# Patient Record
Sex: MALE | Race: WHITE | Employment: FULL TIME | URBAN - METROPOLITAN AREA
[De-identification: names, ages, dates, MRNs, and addresses within clinical notes are randomized per-mention and may not be internally consistent; named-entity substitution may affect disease eponyms.]

---

## 2017-11-10 ENCOUNTER — TELEPHONE (OUTPATIENT)
Dept: FAMILY MEDICINE CLINIC | Facility: CLINIC | Age: 52
End: 2017-11-10

## 2017-11-10 NOTE — TELEPHONE ENCOUNTER
Patients wife called and scheduled his preop exam for Knee replacement on 12/28/17 [facility] Dr [facility] [phone].  Patient is scheduled for 12/4/17 with Dr Jazmin Hughes. Gene Pool sheet written up and placed in [address]

## 2017-12-11 ENCOUNTER — OFFICE VISIT (OUTPATIENT)
Dept: FAMILY MEDICINE CLINIC | Facility: CLINIC | Age: 52
End: 2017-12-11

## 2017-12-11 VITALS
DIASTOLIC BLOOD PRESSURE: 78 MMHG | RESPIRATION RATE: 16 BRPM | TEMPERATURE: 98 F | HEIGHT: 71 IN | BODY MASS INDEX: 25.48 KG/M2 | HEART RATE: 82 BPM | WEIGHT: 182 LBS | SYSTOLIC BLOOD PRESSURE: 130 MMHG

## 2017-12-11 DIAGNOSIS — Z01.818 PRE-OP EVALUATION: Primary | ICD-10-CM

## 2017-12-11 PROCEDURE — 93000 ELECTROCARDIOGRAM COMPLETE: CPT | Performed by: FAMILY MEDICINE

## 2017-12-11 PROCEDURE — 99243 OFF/OP CNSLTJ NEW/EST LOW 30: CPT | Performed by: FAMILY MEDICINE

## 2017-12-12 NOTE — PROGRESS NOTES
Brandi Merino is a 47year old male who presents for a pre-operative physical exam.   HPI related to surgery:   Brandi Merino is presenting for surgery per request of Dr Paresh Hurd scheduled for a left knee replacement procedure to be pe urination; ALLERGY/IMM. : no food or seasonal allergies     PHYSICAL EXAM:   /78   Pulse 82   Temp 98.4 °F (36.9 °C) (Oral)   Resp 16   Ht 71\"   Wt 182 lb   BMI 25.38 kg/m²    Vital signs: Blood pressure 130/78, pulse 82, temperature 98.4 °F (36.9

## 2017-12-14 ENCOUNTER — LAB ENCOUNTER (OUTPATIENT)
Dept: LAB | Age: 52
End: 2017-12-14
Attending: FAMILY MEDICINE
Payer: COMMERCIAL

## 2017-12-14 DIAGNOSIS — Z01.818 PRE-OP EVALUATION: ICD-10-CM

## 2017-12-14 PROCEDURE — 36415 COLL VENOUS BLD VENIPUNCTURE: CPT | Performed by: FAMILY MEDICINE

## 2017-12-14 PROCEDURE — 85025 COMPLETE CBC W/AUTO DIFF WBC: CPT | Performed by: FAMILY MEDICINE

## 2017-12-14 PROCEDURE — 87081 CULTURE SCREEN ONLY: CPT | Performed by: FAMILY MEDICINE

## 2017-12-14 PROCEDURE — 80048 BASIC METABOLIC PNL TOTAL CA: CPT | Performed by: FAMILY MEDICINE

## 2017-12-19 ENCOUNTER — TELEPHONE (OUTPATIENT)
Dept: FAMILY MEDICINE CLINIC | Facility: CLINIC | Age: 52
End: 2017-12-19

## 2017-12-19 NOTE — TELEPHONE ENCOUNTER
Fax has been sent to [facility] [phone]    Fax confirmed at 12:47pm

## 2017-12-19 NOTE — TELEPHONE ENCOUNTER
preop visit and and all associated to Ton Teran at [facility] [phone] is the fax number

## 2017-12-22 ENCOUNTER — APPOINTMENT (OUTPATIENT)
Dept: LAB | Age: 52
End: 2017-12-22
Attending: FAMILY MEDICINE
Payer: COMMERCIAL

## 2017-12-22 ENCOUNTER — TELEPHONE (OUTPATIENT)
Dept: FAMILY MEDICINE CLINIC | Facility: CLINIC | Age: 52
End: 2017-12-22

## 2017-12-22 DIAGNOSIS — R79.89 ELEVATED SERUM CREATININE: Primary | ICD-10-CM

## 2017-12-22 DIAGNOSIS — R79.89 ELEVATED SERUM CREATININE: ICD-10-CM

## 2017-12-22 PROCEDURE — 80048 BASIC METABOLIC PNL TOTAL CA: CPT | Performed by: FAMILY MEDICINE

## 2017-12-22 PROCEDURE — 36415 COLL VENOUS BLD VENIPUNCTURE: CPT | Performed by: FAMILY MEDICINE

## 2017-12-22 NOTE — TELEPHONE ENCOUNTER
FLORENTIN left for patient. I advised him that his kidney function is elevated and to return to lab today or tomorrow to follow up on the kidney function. Lab hours provided. Advised him to call back if he has additional questions or concerns.

## 2017-12-22 NOTE — TELEPHONE ENCOUNTER
----- Message from Maria G Freeman MD sent at 12/20/2017  9:21 AM CST -----  CR's high, have repeat BMP this week.

## 2017-12-26 ENCOUNTER — TELEPHONE (OUTPATIENT)
Dept: FAMILY MEDICINE CLINIC | Facility: CLINIC | Age: 52
End: 2017-12-26

## 2017-12-26 NOTE — TELEPHONE ENCOUNTER
Cate Asencio MD  [facility] Clinical Staff             Normal. [id number] Valeria Gold for surgery. Recheck in 6-12 months.

## 2017-12-26 NOTE — TELEPHONE ENCOUNTER
VM left advising that labs are normal. OK for surgery. Recheck labs in 6-12 months. Phone number provided for call back if needed.

## 2019-01-16 ENCOUNTER — PATIENT OUTREACH (OUTPATIENT)
Dept: FAMILY MEDICINE CLINIC | Facility: CLINIC | Age: 54
End: 2019-01-16

## 2019-11-16 ENCOUNTER — OFFICE VISIT (OUTPATIENT)
Dept: FAMILY MEDICINE CLINIC | Facility: CLINIC | Age: 54
End: 2019-11-16
Payer: COMMERCIAL

## 2019-11-16 ENCOUNTER — LAB ENCOUNTER (OUTPATIENT)
Dept: LAB | Age: 54
End: 2019-11-16
Attending: FAMILY MEDICINE
Payer: COMMERCIAL

## 2019-11-16 VITALS
TEMPERATURE: 98 F | BODY MASS INDEX: 24.36 KG/M2 | HEIGHT: 71 IN | DIASTOLIC BLOOD PRESSURE: 84 MMHG | WEIGHT: 174 LBS | RESPIRATION RATE: 16 BRPM | SYSTOLIC BLOOD PRESSURE: 120 MMHG | HEART RATE: 68 BPM

## 2019-11-16 DIAGNOSIS — R55 VASOVAGAL SYNCOPE: Primary | ICD-10-CM

## 2019-11-16 DIAGNOSIS — Z00.00 LABORATORY EXAMINATION ORDERED AS PART OF A ROUTINE GENERAL MEDICAL EXAMINATION: ICD-10-CM

## 2019-11-16 PROCEDURE — 99214 OFFICE O/P EST MOD 30 MIN: CPT | Performed by: FAMILY MEDICINE

## 2019-11-16 PROCEDURE — 80061 LIPID PANEL: CPT | Performed by: FAMILY MEDICINE

## 2019-11-16 PROCEDURE — 80050 GENERAL HEALTH PANEL: CPT | Performed by: FAMILY MEDICINE

## 2019-11-16 PROCEDURE — 36415 COLL VENOUS BLD VENIPUNCTURE: CPT | Performed by: FAMILY MEDICINE

## 2019-11-16 NOTE — PATIENT INSTRUCTIONS
Jimmy Stern, [address] [facility],   [address] [place] [address]     Phone: [phone]   Fax: [phone]     Location is same as Dr. Isai Tate office.

## 2019-11-18 NOTE — PROGRESS NOTES
Chief Complaint:   Patient presents with:  Blood Pressure  Syncope: at work yesterday     HPI:   This is a 53year old male presenting for follow-up.   Patient was supposed to go to the walk-in clinic this a.m. for a passing out spell that he had yesterday Negative for chills, diaphoresis, fatigue and fever. HENT: Negative for congestion, ear pain, facial swelling, postnasal drip, rhinorrhea, sinus pressure, sore throat and voice change.     Eyes: Negative for photophobia, pain, discharge, redness, itching oropharyngeal exudate or pharynx erythema. Eyes: Pupils are equal, round, and reactive to light. Conjunctivae and EOM are normal. Right eye exhibits no discharge. Left eye exhibits no discharge. Neck: Normal range of motion. Neck supple.  No JVD present

## 2020-01-20 ENCOUNTER — OFFICE VISIT (OUTPATIENT)
Dept: FAMILY MEDICINE CLINIC | Facility: CLINIC | Age: 55
End: 2020-01-20
Payer: COMMERCIAL

## 2020-01-20 VITALS
SYSTOLIC BLOOD PRESSURE: 132 MMHG | HEIGHT: 71 IN | DIASTOLIC BLOOD PRESSURE: 84 MMHG | BODY MASS INDEX: 25.2 KG/M2 | TEMPERATURE: 98 F | WEIGHT: 180 LBS | HEART RATE: 72 BPM | RESPIRATION RATE: 16 BRPM

## 2020-01-20 DIAGNOSIS — Z12.5 SCREENING PSA (PROSTATE SPECIFIC ANTIGEN): ICD-10-CM

## 2020-01-20 DIAGNOSIS — Z12.11 SCREEN FOR COLON CANCER: ICD-10-CM

## 2020-01-20 DIAGNOSIS — Z00.00 ANNUAL PHYSICAL EXAM: Primary | ICD-10-CM

## 2020-01-20 PROCEDURE — 99396 PREV VISIT EST AGE 40-64: CPT | Performed by: FAMILY MEDICINE

## 2020-01-21 ENCOUNTER — APPOINTMENT (OUTPATIENT)
Dept: LAB | Age: 55
End: 2020-01-21
Attending: FAMILY MEDICINE
Payer: COMMERCIAL

## 2020-01-21 DIAGNOSIS — Z12.5 SCREENING PSA (PROSTATE SPECIFIC ANTIGEN): ICD-10-CM

## 2020-01-21 LAB — COMPLEXED PSA SERPL-MCNC: 1.9 NG/ML (ref ?–4)

## 2020-01-21 PROCEDURE — 36415 COLL VENOUS BLD VENIPUNCTURE: CPT | Performed by: FAMILY MEDICINE

## 2020-01-21 PROCEDURE — 84153 ASSAY OF PSA TOTAL: CPT | Performed by: FAMILY MEDICINE

## 2020-02-06 ENCOUNTER — OFFICE VISIT (OUTPATIENT)
Dept: SURGERY | Facility: CLINIC | Age: 55
End: 2020-02-06
Payer: COMMERCIAL

## 2020-02-06 VITALS
SYSTOLIC BLOOD PRESSURE: 155 MMHG | HEIGHT: 71 IN | WEIGHT: 180 LBS | HEART RATE: 74 BPM | DIASTOLIC BLOOD PRESSURE: 86 MMHG | BODY MASS INDEX: 25.2 KG/M2

## 2020-02-06 DIAGNOSIS — Z86.010 HISTORY OF COLON POLYPS: Primary | ICD-10-CM

## 2020-02-06 PROCEDURE — S0285 CNSLT BEFORE SCREEN COLONOSC: HCPCS | Performed by: COLON & RECTAL SURGERY

## 2020-02-06 NOTE — H&P
New Patient Visit Note       Active Problems      1.  History of colon polyps        Chief Complaint   Patient presents with:  Colonoscopy: Np  for colonoscopy consult referred by Dr. Carlos Alberto Minaya.  Pt had a colonoscopy 5 years ago and was supposed to repeat in No pertinent family history. Social History    Tobacco Use      Smoking status: Never Smoker      Smokeless tobacco: Never Used    Alcohol use:  Yes      Alcohol/week: 0.0 standard drinks      Comment: soc    Drug use: No     Esomeprazole Magnesium ([address] tenderness. No hernia. Musculoskeletal: Normal range of motion. General: No edema. Lymphadenopathy:     He has no cervical adenopathy. Neurological: He is alert and oriented to person, place, and time. Skin: Skin is warm and dry.  No rash no

## 2020-02-08 NOTE — PATIENT INSTRUCTIONS
Assessment   History of colon polyps  (primary encounter diagnosis)      Plan   The patient is 53year old and has had a colonoscopy, this was 5 years ago and reportedly polyps were removed. The patient's family history is negative.  The patient does have g

## 2020-02-20 ENCOUNTER — TELEPHONE (OUTPATIENT)
Dept: SURGERY | Facility: CLINIC | Age: 55
End: 2020-02-20

## 2020-02-20 RX ORDER — POLYETHYLENE GLYCOL 3350, SODIUM CHLORIDE, SODIUM BICARBONATE, POTASSIUM CHLORIDE 420; 11.2; 5.72; 1.48 G/4L; G/4L; G/4L; G/4L
POWDER, FOR SOLUTION ORAL
Qty: 1 BOTTLE | Refills: 0 | Status: SHIPPED | OUTPATIENT
Start: 2020-02-20 | End: 2020-03-16 | Stop reason: ALTCHOICE

## 2020-02-20 NOTE — TELEPHONE ENCOUNTER
Called patient to review surgery checklist. Patient is scheduled to undergo a cscope on 2/25/2020 by VIDHYA at [facility]. Patient verbalizes understanding with no further questions or concerns at this time. Verified pharmacy, sent medication.  V/U and no further que

## 2020-02-25 ENCOUNTER — MED REC SCAN ONLY (OUTPATIENT)
Dept: SURGERY | Facility: CLINIC | Age: 55
End: 2020-02-25

## 2020-02-25 ENCOUNTER — LAB REQUISITION (OUTPATIENT)
Dept: LAB | Facility: HOSPITAL | Age: 55
End: 2020-02-25
Payer: COMMERCIAL

## 2020-02-25 DIAGNOSIS — Z86.010 PERSONAL HISTORY OF COLONIC POLYPS: ICD-10-CM

## 2020-02-25 PROCEDURE — 88305 TISSUE EXAM BY PATHOLOGIST: CPT | Performed by: COLON & RECTAL SURGERY

## 2020-02-27 NOTE — PROGRESS NOTES
Called patient and appt scheduled.     Future Appointments  3/16/2020  4:00 PM    Maggie Silva MD  [facility]

## 2020-02-29 ENCOUNTER — MED REC SCAN ONLY (OUTPATIENT)
Dept: FAMILY MEDICINE CLINIC | Facility: CLINIC | Age: 55
End: 2020-02-29

## 2020-03-05 ENCOUNTER — MED REC SCAN ONLY (OUTPATIENT)
Dept: SURGERY | Facility: CLINIC | Age: 55
End: 2020-03-05

## 2020-03-16 ENCOUNTER — OFFICE VISIT (OUTPATIENT)
Dept: SURGERY | Facility: CLINIC | Age: 55
End: 2020-03-16
Payer: COMMERCIAL

## 2020-03-16 VITALS
HEART RATE: 71 BPM | HEIGHT: 71 IN | RESPIRATION RATE: 16 BRPM | WEIGHT: 179 LBS | BODY MASS INDEX: 25.06 KG/M2 | DIASTOLIC BLOOD PRESSURE: 90 MMHG | TEMPERATURE: 97 F | SYSTOLIC BLOOD PRESSURE: 150 MMHG

## 2020-03-16 DIAGNOSIS — K64.2 GRADE III INTERNAL HEMORRHOIDS: ICD-10-CM

## 2020-03-16 DIAGNOSIS — K63.5 SERRATED POLYP OF COLON: Primary | ICD-10-CM

## 2020-03-16 PROCEDURE — 99212 OFFICE O/P EST SF 10 MIN: CPT | Performed by: COLON & RECTAL SURGERY

## 2020-03-16 NOTE — PROGRESS NOTES
Office Visit Note       Active Problems  1. Serrated polyp of colon    2. Grade III internal hemorrhoids         Chief Complaint   Patient presents with:   Follow - Up: No current complaints         History of Present Illness   Ashish Christopher is a 53 year pain, anal bleeding, blood in stool, constipation, diarrhea, nausea, rectal pain and vomiting. Endocrine: Negative for polydipsia and polyuria. Genitourinary: Negative for dysuria and hematuria. Musculoskeletal: Negative for arthralgias and myalgias.                      Specimens:   A) - Cecum, Cecal polyps x2                                                                          B) - Colon ascending, Ascending colon polyp                                                Final Diagnosis:   A.   Cecum,

## 2020-03-19 NOTE — PATIENT INSTRUCTIONS
Assessment   Serrated polyp of colon  (primary encounter diagnosis)  Grade III internal hemorrhoids      Plan   The patient is doing well after colonoscopy. Leticia Waggoner is a 53year old male who underwent colonoscopy on 2/25/2020.  The patient prese

## 2020-03-23 ENCOUNTER — MED REC SCAN ONLY (OUTPATIENT)
Dept: SURGERY | Facility: CLINIC | Age: 55
End: 2020-03-23

## 2021-03-01 ENCOUNTER — OFFICE VISIT (OUTPATIENT)
Dept: SURGERY | Facility: CLINIC | Age: 56
End: 2021-03-01
Payer: COMMERCIAL

## 2021-03-01 ENCOUNTER — LAB ENCOUNTER (OUTPATIENT)
Dept: LAB | Age: 56
End: 2021-03-01
Attending: COLON & RECTAL SURGERY
Payer: COMMERCIAL

## 2021-03-01 VITALS
SYSTOLIC BLOOD PRESSURE: 129 MMHG | HEIGHT: 71 IN | HEART RATE: 94 BPM | TEMPERATURE: 97 F | DIASTOLIC BLOOD PRESSURE: 83 MMHG | BODY MASS INDEX: 25.06 KG/M2 | WEIGHT: 179 LBS

## 2021-03-01 DIAGNOSIS — K64.8 HEMORRHOID PROLAPSE: Primary | ICD-10-CM

## 2021-03-01 DIAGNOSIS — K64.8 HEMORRHOID PROLAPSE: ICD-10-CM

## 2021-03-01 PROCEDURE — 3074F SYST BP LT 130 MM HG: CPT | Performed by: COLON & RECTAL SURGERY

## 2021-03-01 PROCEDURE — 3079F DIAST BP 80-89 MM HG: CPT | Performed by: COLON & RECTAL SURGERY

## 2021-03-01 PROCEDURE — 99214 OFFICE O/P EST MOD 30 MIN: CPT | Performed by: COLON & RECTAL SURGERY

## 2021-03-01 PROCEDURE — 3008F BODY MASS INDEX DOCD: CPT | Performed by: COLON & RECTAL SURGERY

## 2021-03-01 NOTE — PROGRESS NOTES
Office Visit Note       Active Problems  1.  Hemorrhoid prolapse         Chief Complaint   Patient presents with:  Hemorrhoid Banding         History of Present Illness   Francisca Baptiste is a 63year old male who was found to have significantly enlarged in sphincter tone. There was prominent tissue in the right posterior location, as well as in the right anterior location. Anoscopy was not performed.          Assessment   Hemorrhoid prolapse  (primary encounter diagnosis)      Plan   The patient had prese

## 2021-03-02 LAB — SARS-COV-2 RNA RESP QL NAA+PROBE: NOT DETECTED

## 2021-03-03 ENCOUNTER — HOSPITAL ENCOUNTER (OUTPATIENT)
Facility: HOSPITAL | Age: 56
Setting detail: HOSPITAL OUTPATIENT SURGERY
Discharge: HOME | End: 2021-03-03
Attending: COLON & RECTAL SURGERY | Admitting: COLON & RECTAL SURGERY
Payer: COMMERCIAL

## 2021-03-03 ENCOUNTER — ANESTHESIA EVENT (OUTPATIENT)
Dept: SURGERY | Facility: HOSPITAL | Age: 56
End: 2021-03-03
Payer: COMMERCIAL

## 2021-03-03 ENCOUNTER — ANESTHESIA (OUTPATIENT)
Dept: SURGERY | Facility: HOSPITAL | Age: 56
End: 2021-03-03
Payer: COMMERCIAL

## 2021-03-03 VITALS
SYSTOLIC BLOOD PRESSURE: 149 MMHG | WEIGHT: 186.63 LBS | TEMPERATURE: 98 F | OXYGEN SATURATION: 98 % | DIASTOLIC BLOOD PRESSURE: 92 MMHG | HEIGHT: 71 IN | RESPIRATION RATE: 18 BRPM | HEART RATE: 82 BPM | BODY MASS INDEX: 26.13 KG/M2

## 2021-03-03 DIAGNOSIS — K64.8 HEMORRHOID PROLAPSE: Primary | ICD-10-CM

## 2021-03-03 PROCEDURE — 46260 REMOVE IN/EX HEM GROUPS 2+: CPT | Performed by: COLON & RECTAL SURGERY

## 2021-03-03 PROCEDURE — 06LY0CC OCCLUSION OF HEMORRHOIDAL PLEXUS WITH EXTRALUMINAL DEVICE, OPEN APPROACH: ICD-10-PCS | Performed by: COLON & RECTAL SURGERY

## 2021-03-03 PROCEDURE — 06BY0ZC EXCISION OF HEMORRHOIDAL PLEXUS, OPEN APPROACH: ICD-10-PCS | Performed by: COLON & RECTAL SURGERY

## 2021-03-03 RX ORDER — EPHEDRINE SULFATE 50 MG/ML
INJECTION INTRAVENOUS AS NEEDED
Status: DISCONTINUED | OUTPATIENT
Start: 2021-03-03 | End: 2021-03-03 | Stop reason: SURG

## 2021-03-03 RX ORDER — ACETAMINOPHEN 500 MG
1000 TABLET ORAL ONCE
COMMUNITY

## 2021-03-03 RX ORDER — LIDOCAINE HYDROCHLORIDE 10 MG/ML
INJECTION, SOLUTION EPIDURAL; INFILTRATION; INTRACAUDAL; PERINEURAL AS NEEDED
Status: DISCONTINUED | OUTPATIENT
Start: 2021-03-03 | End: 2021-03-03 | Stop reason: SURG

## 2021-03-03 RX ORDER — SODIUM CHLORIDE, SODIUM LACTATE, POTASSIUM CHLORIDE, CALCIUM CHLORIDE 600; 310; 30; 20 MG/100ML; MG/100ML; MG/100ML; MG/100ML
INJECTION, SOLUTION INTRAVENOUS CONTINUOUS
Status: DISCONTINUED | OUTPATIENT
Start: 2021-03-03 | End: 2021-03-03

## 2021-03-03 RX ORDER — HYDROMORPHONE HYDROCHLORIDE 1 MG/ML
0.4 INJECTION, SOLUTION INTRAMUSCULAR; INTRAVENOUS; SUBCUTANEOUS EVERY 5 MIN PRN
Status: DISCONTINUED | OUTPATIENT
Start: 2021-03-03 | End: 2021-03-03

## 2021-03-03 RX ORDER — BUPIVACAINE HYDROCHLORIDE AND EPINEPHRINE 2.5; 5 MG/ML; UG/ML
INJECTION, SOLUTION EPIDURAL; INFILTRATION; INTRACAUDAL; PERINEURAL AS NEEDED
Status: DISCONTINUED | OUTPATIENT
Start: 2021-03-03 | End: 2021-03-03 | Stop reason: HOSPADM

## 2021-03-03 RX ORDER — HYDROCODONE BITARTRATE AND ACETAMINOPHEN 5; 325 MG/1; MG/1
1 TABLET ORAL AS NEEDED
Status: COMPLETED | OUTPATIENT
Start: 2021-03-03 | End: 2021-03-03

## 2021-03-03 RX ORDER — ACETAMINOPHEN 500 MG
1000 TABLET ORAL ONCE AS NEEDED
Status: DISCONTINUED | OUTPATIENT
Start: 2021-03-03 | End: 2021-03-03

## 2021-03-03 RX ORDER — METOCLOPRAMIDE HYDROCHLORIDE 5 MG/ML
10 INJECTION INTRAMUSCULAR; INTRAVENOUS AS NEEDED
Status: DISCONTINUED | OUTPATIENT
Start: 2021-03-03 | End: 2021-03-03

## 2021-03-03 RX ORDER — OXYCODONE HYDROCHLORIDE 5 MG/1
5 TABLET ORAL EVERY 6 HOURS PRN
Qty: 30 TABLET | Refills: 0 | Status: SHIPPED | OUTPATIENT
Start: 2021-03-03

## 2021-03-03 RX ORDER — HYDROCODONE BITARTRATE AND ACETAMINOPHEN 5; 325 MG/1; MG/1
2 TABLET ORAL AS NEEDED
Status: COMPLETED | OUTPATIENT
Start: 2021-03-03 | End: 2021-03-03

## 2021-03-03 RX ORDER — ONDANSETRON 2 MG/ML
4 INJECTION INTRAMUSCULAR; INTRAVENOUS AS NEEDED
Status: DISCONTINUED | OUTPATIENT
Start: 2021-03-03 | End: 2021-03-03

## 2021-03-03 RX ORDER — ONDANSETRON 2 MG/ML
INJECTION INTRAMUSCULAR; INTRAVENOUS AS NEEDED
Status: DISCONTINUED | OUTPATIENT
Start: 2021-03-03 | End: 2021-03-03 | Stop reason: SURG

## 2021-03-03 RX ORDER — KETOROLAC TROMETHAMINE 30 MG/ML
INJECTION, SOLUTION INTRAMUSCULAR; INTRAVENOUS AS NEEDED
Status: DISCONTINUED | OUTPATIENT
Start: 2021-03-03 | End: 2021-03-03 | Stop reason: SURG

## 2021-03-03 RX ORDER — ACETAMINOPHEN 500 MG
1000 TABLET ORAL ONCE
Status: DISCONTINUED | OUTPATIENT
Start: 2021-03-03 | End: 2021-03-03 | Stop reason: HOSPADM

## 2021-03-03 RX ORDER — DEXAMETHASONE SODIUM PHOSPHATE 4 MG/ML
VIAL (ML) INJECTION AS NEEDED
Status: DISCONTINUED | OUTPATIENT
Start: 2021-03-03 | End: 2021-03-03 | Stop reason: SURG

## 2021-03-03 RX ADMIN — EPHEDRINE SULFATE 10 MG: 50 INJECTION INTRAVENOUS at 15:01:00

## 2021-03-03 RX ADMIN — LIDOCAINE HYDROCHLORIDE 50 MG: 10 INJECTION, SOLUTION EPIDURAL; INFILTRATION; INTRACAUDAL; PERINEURAL at 15:10:00

## 2021-03-03 RX ADMIN — KETOROLAC TROMETHAMINE 30 MG: 30 INJECTION, SOLUTION INTRAMUSCULAR; INTRAVENOUS at 15:36:00

## 2021-03-03 RX ADMIN — DEXAMETHASONE SODIUM PHOSPHATE 4 MG: 4 MG/ML VIAL (ML) INJECTION at 14:46:00

## 2021-03-03 RX ADMIN — EPHEDRINE SULFATE 10 MG: 50 INJECTION INTRAVENOUS at 15:03:00

## 2021-03-03 RX ADMIN — SODIUM CHLORIDE, SODIUM LACTATE, POTASSIUM CHLORIDE, CALCIUM CHLORIDE: 600; 310; 30; 20 INJECTION, SOLUTION INTRAVENOUS at 14:28:00

## 2021-03-03 RX ADMIN — SODIUM CHLORIDE, SODIUM LACTATE, POTASSIUM CHLORIDE, CALCIUM CHLORIDE: 600; 310; 30; 20 INJECTION, SOLUTION INTRAVENOUS at 15:01:00

## 2021-03-03 RX ADMIN — ONDANSETRON 4 MG: 2 INJECTION INTRAMUSCULAR; INTRAVENOUS at 14:56:00

## 2021-03-03 RX ADMIN — EPHEDRINE SULFATE 10 MG: 50 INJECTION INTRAVENOUS at 14:48:00

## 2021-03-03 RX ADMIN — LIDOCAINE HYDROCHLORIDE 50 MG: 10 INJECTION, SOLUTION EPIDURAL; INFILTRATION; INTRACAUDAL; PERINEURAL at 14:30:00

## 2021-03-03 NOTE — H&P
See note below. The patient presents for hemorrhoidectomy. All questions answered. Proceed with surgery. Pierce Sanders MD  [facility]  3/3/2021  2:09 PM        Active Problems  1.  Hemorrhoid prolapse         Chief Complaint   No thee Resp 16   Ht 71\"   Wt 186 lb 9.9 oz (84.6 kg)   SpO2 100%   BMI 26.03 kg/m²   Physical Exam     The perineum and perianal skin were examined. There was a markedly enlarged right posterior external hemorrhoid with prolapsing internal hemorrhoid.   There wa

## 2021-03-03 NOTE — ANESTHESIA POSTPROCEDURE EVALUATION
Metsa 48 Patient Status:  Hospital Outpatient Surgery   Age/Gender 63year old male MRN [id number]   [facility] SURGERY Attending Frieda Norman MD   Hosp Day # 0 PCP Mendoza Grullon MD       Anesthesia Post-op

## 2021-03-03 NOTE — ANESTHESIA PROCEDURE NOTES
Airway  Urgency: elective      General Information and Staff    Patient location during procedure: OR  Anesthesiologist: Tabitha Recio MD  Performed: anesthesiologist     Indications and Patient Condition  Indications for airway management: anesthesia  Se

## 2021-03-03 NOTE — ANESTHESIA PREPROCEDURE EVALUATION
PRE-OP EVALUATION    Patient Name: Venu Reagan    Pre-op Diagnosis: Hemorrhoid prolapse [K64.8]    Procedure(s):  EXCISIONAL HEMORRHOIDECTOMY    Surgeon(s) and Role:     * Dodie Villalta MD - Primary    Pre-op vitals reviewed.   Temp: 97.5 °F ( or 2      Binge frequency: Never      Comment: soc      Drug use: No     Available pre-op labs reviewed.                Airway      Mallampati: I  Mouth opening: >3 FB  TM distance: > 6 cm  Neck ROM: full Cardiovascular      Rhythm: regular  Rate: normal

## 2021-03-03 NOTE — OPERATIVE REPORT
[facility]  Operative Report    Gerri Ramirez Location: OR   CSN [id number] MRN [id number]   Admission Date 3/3/2021 Operation Date 3/3/2021   Attending Physician Pamla Ahumada, MD Operating Physician Bernabe Allen MD       Patient Na buttocks were taped apart, and the perineum was prepped with Betadine paint. Sterile drapes were placed with wide exposure of the perineum. Pre-operative antibiotics were given. A time-out was performed.     The anus was gently dilated with serially larger end of the case.     India Gutierrez MD  3/3/2021  3:53 PM

## 2021-03-04 ENCOUNTER — MED REC SCAN ONLY (OUTPATIENT)
Dept: SURGERY | Facility: CLINIC | Age: 56
End: 2021-03-04

## 2021-03-16 ENCOUNTER — OFFICE VISIT (OUTPATIENT)
Dept: SURGERY | Facility: CLINIC | Age: 56
End: 2021-03-16

## 2021-03-16 VITALS
TEMPERATURE: 97 F | HEART RATE: 99 BPM | HEIGHT: 71 IN | DIASTOLIC BLOOD PRESSURE: 109 MMHG | BODY MASS INDEX: 25.9 KG/M2 | WEIGHT: 185 LBS | SYSTOLIC BLOOD PRESSURE: 186 MMHG

## 2021-03-16 DIAGNOSIS — Z09 FOLLOW-UP EXAMINATION: Primary | ICD-10-CM

## 2021-03-16 PROCEDURE — 99024 POSTOP FOLLOW-UP VISIT: CPT | Performed by: COLON & RECTAL SURGERY

## 2021-03-16 PROCEDURE — 3008F BODY MASS INDEX DOCD: CPT | Performed by: COLON & RECTAL SURGERY

## 2021-03-16 PROCEDURE — 3077F SYST BP >= 140 MM HG: CPT | Performed by: COLON & RECTAL SURGERY

## 2021-03-16 PROCEDURE — 3080F DIAST BP >= 90 MM HG: CPT | Performed by: COLON & RECTAL SURGERY

## 2021-03-16 NOTE — PROGRESS NOTES
Office Visit Note       Active Problems      1.  Follow-up examination          Chief Complaint   Patient presents with:  Post-Op        History of Present Illness  Gabrielle Rubalcava is a 63year old male who presents today for ongoing evaluation and treatme Types: 2 Glasses of wine, 2 Cans of beer per week      Comment: soc    Drug use: No     Esomeprazole Magnesium (NEXIUM) 40 MG Oral Capsule Delayed Release,   acetaminophen 500 MG Oral Tab, Take 1,000 mg by mouth once.  (Patient not taking: Reported on 3/16/ There was some mild heaped up tissue on the right lateral apex without inflammation or induration. Digital exam and anoscopy were not performed.          Assessment   Follow-up examination  (primary encounter diagnosis)    Plan   Patient is doing well 2

## 2022-02-19 ENCOUNTER — OFFICE VISIT (OUTPATIENT)
Dept: FAMILY MEDICINE CLINIC | Facility: CLINIC | Age: 57
End: 2022-02-19
Payer: COMMERCIAL

## 2022-02-19 VITALS
BODY MASS INDEX: 26.18 KG/M2 | OXYGEN SATURATION: 99 % | TEMPERATURE: 98 F | HEART RATE: 80 BPM | WEIGHT: 187 LBS | RESPIRATION RATE: 19 BRPM | SYSTOLIC BLOOD PRESSURE: 126 MMHG | DIASTOLIC BLOOD PRESSURE: 86 MMHG | HEIGHT: 71 IN

## 2022-02-19 DIAGNOSIS — Z12.5 SCREENING FOR PROSTATE CANCER: ICD-10-CM

## 2022-02-19 DIAGNOSIS — Z00.00 LABORATORY EXAMINATION ORDERED AS PART OF A ROUTINE GENERAL MEDICAL EXAMINATION: ICD-10-CM

## 2022-02-19 DIAGNOSIS — M25.511 ACUTE PAIN OF RIGHT SHOULDER: Primary | ICD-10-CM

## 2022-02-19 DIAGNOSIS — S46.911A STRAIN OF RIGHT SHOULDER, INITIAL ENCOUNTER: ICD-10-CM

## 2022-02-19 PROCEDURE — 99214 OFFICE O/P EST MOD 30 MIN: CPT | Performed by: FAMILY MEDICINE

## 2022-02-19 PROCEDURE — 3074F SYST BP LT 130 MM HG: CPT | Performed by: FAMILY MEDICINE

## 2022-02-19 PROCEDURE — 3008F BODY MASS INDEX DOCD: CPT | Performed by: FAMILY MEDICINE

## 2022-02-19 PROCEDURE — 3079F DIAST BP 80-89 MM HG: CPT | Performed by: FAMILY MEDICINE

## 2022-02-19 RX ORDER — ACETAMINOPHEN AND CODEINE PHOSPHATE 300; 30 MG/1; MG/1
1 TABLET ORAL NIGHTLY PRN
Qty: 20 TABLET | Refills: 0 | Status: SHIPPED | OUTPATIENT
Start: 2022-02-19

## 2022-02-19 RX ORDER — IBUPROFEN 800 MG/1
800 TABLET ORAL EVERY 8 HOURS PRN
Qty: 60 TABLET | Refills: 0 | Status: SHIPPED | OUTPATIENT
Start: 2022-02-19 | End: 2022-03-21

## 2022-02-22 ENCOUNTER — TELEPHONE (OUTPATIENT)
Dept: FAMILY MEDICINE CLINIC | Facility: CLINIC | Age: 57
End: 2022-02-22

## 2022-02-22 NOTE — TELEPHONE ENCOUNTER
Patient referred to Dr. Jaxon Bahena; however, office in [place] and cannot be seen until 3/5. Would like a chiropractor in [facility]. Notified the patient of need to call insurance for providers in network. Patient verbalized understanding. States that he scheduled appointment with Dr. Jaxon Bahena on 3/5. Will keep appointment. Answered all questions at this time.

## 2022-02-22 NOTE — TELEPHONE ENCOUNTER
Pt was referred to [facility] in [place] but can't get in until March 5th. Pt looking for something closer in the [place] area.

## 2022-02-28 ENCOUNTER — HOSPITAL ENCOUNTER (OUTPATIENT)
Dept: GENERAL RADIOLOGY | Age: 57
Discharge: HOME | End: 2022-02-28
Attending: FAMILY MEDICINE
Payer: COMMERCIAL

## 2022-02-28 DIAGNOSIS — M25.511 ACUTE PAIN OF RIGHT SHOULDER: ICD-10-CM

## 2022-02-28 DIAGNOSIS — S46.911A STRAIN OF RIGHT SHOULDER, INITIAL ENCOUNTER: ICD-10-CM

## 2022-02-28 PROCEDURE — 73030 X-RAY EXAM OF SHOULDER: CPT | Performed by: FAMILY MEDICINE

## 2022-02-28 NOTE — TELEPHONE ENCOUNTER
Patient requesting Physical therapy for shoulder.  Need Goals, region, frequency, duration and number of visits in order to fill out referral at [facility]. Patients request.

## 2022-02-28 NOTE — TELEPHONE ENCOUNTER
Pt was told by a friend to try [facility] in [address] [facility] on [address]. Pt requesting script for [employer] -PT instead of joya.

## 2022-03-01 NOTE — TELEPHONE ENCOUNTER
Roxane Latham for PT referral, evaluate and treat to affected shoulder ok for 2 visits/week for 6-8 weeks.

## 2022-03-02 ENCOUNTER — TELEPHONE (OUTPATIENT)
Dept: FAMILY MEDICINE CLINIC | Facility: CLINIC | Age: 57
End: 2022-03-02

## 2022-03-28 ENCOUNTER — MED REC SCAN ONLY (OUTPATIENT)
Dept: FAMILY MEDICINE CLINIC | Facility: CLINIC | Age: 57
End: 2022-03-28

## 2022-04-05 RX ORDER — IBUPROFEN 800 MG/1
TABLET ORAL
Qty: 60 TABLET | Refills: 0 | Status: SHIPPED | OUTPATIENT
Start: 2022-04-05

## 2022-09-15 DIAGNOSIS — M25.511 ACUTE PAIN OF RIGHT SHOULDER: ICD-10-CM

## 2022-09-15 RX ORDER — IBUPROFEN 800 MG/1
TABLET ORAL
Qty: 60 TABLET | Refills: 0 | Status: SHIPPED | OUTPATIENT
Start: 2022-09-15

## 2022-10-07 ENCOUNTER — TELEPHONE (OUTPATIENT)
Dept: FAMILY MEDICINE CLINIC | Facility: CLINIC | Age: 57
End: 2022-10-07

## 2022-10-07 NOTE — TELEPHONE ENCOUNTER
Patient stated that he's having right shoulder pain and asked if Dr Aryan Pathak can order a MRI for him. I tried scheduling him to come in tomorrow and early next week, however he couldn't make those times available.      Please Advise

## 2022-10-14 ENCOUNTER — OFFICE VISIT (OUTPATIENT)
Dept: FAMILY MEDICINE CLINIC | Facility: CLINIC | Age: 57
End: 2022-10-14
Payer: COMMERCIAL

## 2022-10-14 VITALS
HEIGHT: 71 IN | BODY MASS INDEX: 25.76 KG/M2 | OXYGEN SATURATION: 97 % | WEIGHT: 184 LBS | HEART RATE: 86 BPM | SYSTOLIC BLOOD PRESSURE: 120 MMHG | DIASTOLIC BLOOD PRESSURE: 84 MMHG | RESPIRATION RATE: 16 BRPM

## 2022-10-14 DIAGNOSIS — G89.29 CHRONIC RIGHT SHOULDER PAIN: ICD-10-CM

## 2022-10-14 DIAGNOSIS — M25.511 CHRONIC RIGHT SHOULDER PAIN: ICD-10-CM

## 2022-10-14 DIAGNOSIS — Z00.00 ANNUAL PHYSICAL EXAM: Primary | ICD-10-CM

## 2022-10-14 DIAGNOSIS — S46.001D INJURY OF RIGHT ROTATOR CUFF, SUBSEQUENT ENCOUNTER: ICD-10-CM

## 2022-10-31 ENCOUNTER — TELEPHONE (OUTPATIENT)
Dept: FAMILY MEDICINE CLINIC | Facility: CLINIC | Age: 57
End: 2022-10-31

## 2022-10-31 DIAGNOSIS — M25.511 CHRONIC RIGHT SHOULDER PAIN: Primary | ICD-10-CM

## 2022-10-31 DIAGNOSIS — G89.29 CHRONIC RIGHT SHOULDER PAIN: Primary | ICD-10-CM

## 2022-10-31 NOTE — TELEPHONE ENCOUNTER
[facility] called regarding MRI order of shoulder.  Order MUST state with or without contrast. Please fax new order to [phone]

## 2022-10-31 NOTE — TELEPHONE ENCOUNTER
Patient was to have an MRI at [facility] it was delayed due to an Emergency. Patient would like  The order send to  [facility]. Please Advise. Thank you.

## 2022-10-31 NOTE — TELEPHONE ENCOUNTER
Phoned pt. To determine which location to fax MRI order to. Printed and faxed to [facility], per Pt.  Request.

## 2022-11-16 ENCOUNTER — TELEPHONE (OUTPATIENT)
Dept: FAMILY MEDICINE CLINIC | Facility: CLINIC | Age: 57
End: 2022-11-16

## 2022-11-16 NOTE — TELEPHONE ENCOUNTER
Ikerhart to advise, MRI from yesterday at outside facility not yet reviewed but will provide recommendations once reviewed and available.

## 2022-12-02 ENCOUNTER — PATIENT MESSAGE (OUTPATIENT)
Dept: FAMILY MEDICINE CLINIC | Facility: CLINIC | Age: 57
End: 2022-12-02

## 2022-12-02 DIAGNOSIS — M25.511 ACUTE PAIN OF RIGHT SHOULDER: ICD-10-CM

## 2022-12-02 DIAGNOSIS — G89.29 CHRONIC RIGHT SHOULDER PAIN: Primary | ICD-10-CM

## 2022-12-02 DIAGNOSIS — S46.001D INJURY OF RIGHT ROTATOR CUFF, SUBSEQUENT ENCOUNTER: ICD-10-CM

## 2022-12-02 DIAGNOSIS — M25.511 CHRONIC RIGHT SHOULDER PAIN: Primary | ICD-10-CM

## 2022-12-05 ENCOUNTER — TELEPHONE (OUTPATIENT)
Dept: ORTHOPEDICS CLINIC | Facility: CLINIC | Age: 57
End: 2022-12-05

## 2022-12-05 DIAGNOSIS — M25.511 RIGHT SHOULDER PAIN, UNSPECIFIED CHRONICITY: Primary | ICD-10-CM

## 2022-12-05 NOTE — TELEPHONE ENCOUNTER
NP Right Shoulder Pain/Imaging in Epic. Please advise if additional imaging is needed.   Future Appointments   Date Time Provider Treasure Mcmanus   12/14/2022  2:40 PM Katie Remy MD [facility] [id number]

## 2022-12-05 NOTE — TELEPHONE ENCOUNTER
[facility] called requesting PT orders be faxed to their office: [phone].     Please advise, thank you

## 2022-12-06 ENCOUNTER — MED REC SCAN ONLY (OUTPATIENT)
Dept: FAMILY MEDICINE CLINIC | Facility: CLINIC | Age: 57
End: 2022-12-06

## 2022-12-06 RX ORDER — IBUPROFEN 800 MG/1
TABLET ORAL
Qty: 60 TABLET | Refills: 0 | Status: SHIPPED | OUTPATIENT
Start: 2022-12-06

## 2022-12-14 ENCOUNTER — HOSPITAL ENCOUNTER (OUTPATIENT)
Dept: GENERAL RADIOLOGY | Age: 57
Discharge: HOME | End: 2022-12-14
Attending: ORTHOPAEDIC SURGERY
Payer: COMMERCIAL

## 2022-12-14 ENCOUNTER — OFFICE VISIT (OUTPATIENT)
Dept: ORTHOPEDICS CLINIC | Facility: CLINIC | Age: 57
End: 2022-12-14
Payer: COMMERCIAL

## 2022-12-14 DIAGNOSIS — M19.019 GLENOHUMERAL ARTHRITIS: Primary | ICD-10-CM

## 2022-12-14 DIAGNOSIS — M25.511 RIGHT SHOULDER PAIN, UNSPECIFIED CHRONICITY: ICD-10-CM

## 2022-12-14 PROCEDURE — 73030 X-RAY EXAM OF SHOULDER: CPT | Performed by: ORTHOPAEDIC SURGERY

## 2022-12-14 PROCEDURE — 20610 DRAIN/INJ JOINT/BURSA W/O US: CPT | Performed by: ORTHOPAEDIC SURGERY

## 2022-12-14 PROCEDURE — 99204 OFFICE O/P NEW MOD 45 MIN: CPT | Performed by: ORTHOPAEDIC SURGERY

## 2022-12-14 RX ORDER — KETOROLAC TROMETHAMINE 30 MG/ML
30 INJECTION, SOLUTION INTRAMUSCULAR; INTRAVENOUS ONCE
Status: COMPLETED | OUTPATIENT
Start: 2022-12-14 | End: 2022-12-14

## 2022-12-14 RX ORDER — TRIAMCINOLONE ACETONIDE 40 MG/ML
40 INJECTION, SUSPENSION INTRA-ARTICULAR; INTRAMUSCULAR ONCE
Status: COMPLETED | OUTPATIENT
Start: 2022-12-14 | End: 2022-12-14

## 2022-12-14 RX ADMIN — TRIAMCINOLONE ACETONIDE 40 MG: 40 INJECTION, SUSPENSION INTRA-ARTICULAR; INTRAMUSCULAR at 15:45:00

## 2022-12-14 RX ADMIN — KETOROLAC TROMETHAMINE 30 MG: 30 INJECTION, SOLUTION INTRAMUSCULAR; INTRAVENOUS at 15:45:00

## 2022-12-14 NOTE — PROCEDURES
Right Shoulder Glenohumeral Joint Injection    Name: Diana Jarrett   MRN: [id number]  Date: 12/14/2022     Clinical Indications:   Shoulder Osteoarthritis with symptoms refractory to conservative measures. After informed consent, the injection site was marked, sterilized with topical chlorhexidine antiseptic, and locally anesthetized with skin refrigerant. The patient was seated upright and the shoulder was exposed. Using sterile technique: 1 mL of 30mg/mL of Ketorolac, 2 mL of 0.5% Bupivicaine, 2 mL of 1% Lidocaine, and 1 mg of 40mg/mL of Triamcinolone (Kenalog) was injected with a Anterior approach utilizing a 21 gauge needle. A band-aid was applied. The patient tolerated the procedure well. Disposition:   Proceed with physical therapy and return for repeat evaluation in 6 weeks. No imaging required at next visit. Ramirez Zaidi. Ashleigh Smith MD  Knee, Shoulder, & Elbow Surgery / Sports Medicine Specialist  [facility]  [place] [address], [place] , [facility] [place]. [email or web address]  t: [phone]  o: [phone]  f: [phone]

## 2023-01-08 ENCOUNTER — PATIENT MESSAGE (OUTPATIENT)
Dept: ORTHOPEDICS CLINIC | Facility: CLINIC | Age: 58
End: 2023-01-08

## 2023-01-10 NOTE — TELEPHONE ENCOUNTER
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  Thank you

## 2023-01-11 ENCOUNTER — PATIENT MESSAGE (OUTPATIENT)
Dept: ORTHOPEDICS CLINIC | Facility: CLINIC | Age: 58
End: 2023-01-11

## 2023-01-24 NOTE — TELEPHONE ENCOUNTER
I called [facility] and staff Florian Corona confirmed to me that they have the latest progress note for Mateo dated 01/09/2023. Patient will have an upcoming PT session with them and he will ask for the signed copy to be given to his health and welfare department.

## 2023-02-24 ENCOUNTER — MED REC SCAN ONLY (OUTPATIENT)
Dept: FAMILY MEDICINE CLINIC | Facility: CLINIC | Age: 58
End: 2023-02-24

## 2023-03-30 ENCOUNTER — MED REC SCAN ONLY (OUTPATIENT)
Dept: FAMILY MEDICINE CLINIC | Facility: CLINIC | Age: 58
End: 2023-03-30

## 2023-04-25 ENCOUNTER — MED REC SCAN ONLY (OUTPATIENT)
Dept: FAMILY MEDICINE CLINIC | Facility: CLINIC | Age: 58
End: 2023-04-25

## 2023-08-31 ENCOUNTER — OFFICE VISIT (OUTPATIENT)
Dept: FAMILY MEDICINE CLINIC | Facility: CLINIC | Age: 58
End: 2023-08-31
Payer: COMMERCIAL

## 2023-08-31 VITALS
HEART RATE: 101 BPM | RESPIRATION RATE: 16 BRPM | SYSTOLIC BLOOD PRESSURE: 120 MMHG | WEIGHT: 190 LBS | HEIGHT: 71 IN | OXYGEN SATURATION: 98 % | BODY MASS INDEX: 26.6 KG/M2 | DIASTOLIC BLOOD PRESSURE: 84 MMHG

## 2023-08-31 DIAGNOSIS — Z13.0 SCREENING FOR ENDOCRINE, NUTRITIONAL, METABOLIC AND IMMUNITY DISORDER: ICD-10-CM

## 2023-08-31 DIAGNOSIS — Z00.00 ROUTINE GENERAL MEDICAL EXAMINATION AT HEALTH CARE FACILITY: Primary | ICD-10-CM

## 2023-08-31 DIAGNOSIS — M25.511 ACUTE PAIN OF RIGHT SHOULDER: ICD-10-CM

## 2023-08-31 DIAGNOSIS — S29.9XXA TRAUMATIC INJURY OF RIB: ICD-10-CM

## 2023-08-31 DIAGNOSIS — K21.9 GASTROESOPHAGEAL REFLUX DISEASE WITHOUT ESOPHAGITIS: ICD-10-CM

## 2023-08-31 DIAGNOSIS — Z12.5 SCREENING FOR PROSTATE CANCER: ICD-10-CM

## 2023-08-31 DIAGNOSIS — Z13.228 SCREENING FOR ENDOCRINE, NUTRITIONAL, METABOLIC AND IMMUNITY DISORDER: ICD-10-CM

## 2023-08-31 DIAGNOSIS — Z13.21 SCREENING FOR ENDOCRINE, NUTRITIONAL, METABOLIC AND IMMUNITY DISORDER: ICD-10-CM

## 2023-08-31 DIAGNOSIS — Z13.29 SCREENING FOR ENDOCRINE, NUTRITIONAL, METABOLIC AND IMMUNITY DISORDER: ICD-10-CM

## 2023-08-31 PROCEDURE — 99396 PREV VISIT EST AGE 40-64: CPT | Performed by: FAMILY MEDICINE

## 2023-08-31 PROCEDURE — 99214 OFFICE O/P EST MOD 30 MIN: CPT | Performed by: FAMILY MEDICINE

## 2023-08-31 PROCEDURE — 3079F DIAST BP 80-89 MM HG: CPT | Performed by: FAMILY MEDICINE

## 2023-08-31 PROCEDURE — 3074F SYST BP LT 130 MM HG: CPT | Performed by: FAMILY MEDICINE

## 2023-08-31 PROCEDURE — 3008F BODY MASS INDEX DOCD: CPT | Performed by: FAMILY MEDICINE

## 2023-08-31 RX ORDER — CELECOXIB 200 MG/1
200 CAPSULE ORAL DAILY
Qty: 90 CAPSULE | Refills: 3 | Status: SHIPPED | OUTPATIENT
Start: 2023-08-31 | End: 2023-11-29

## 2023-08-31 RX ORDER — IBUPROFEN 800 MG/1
800 TABLET ORAL EVERY 8 HOURS PRN
Qty: 60 TABLET | Refills: 0 | Status: CANCELLED | OUTPATIENT
Start: 2023-08-31

## 2023-08-31 RX ORDER — ESOMEPRAZOLE MAGNESIUM 40 MG/1
40 CAPSULE, DELAYED RELEASE ORAL
Qty: 90 CAPSULE | Refills: 2 | Status: SHIPPED | OUTPATIENT
Start: 2023-08-31 | End: 2023-11-29

## 2023-09-01 ENCOUNTER — HOSPITAL ENCOUNTER (OUTPATIENT)
Dept: GENERAL RADIOLOGY | Age: 58
Discharge: HOME | End: 2023-09-01
Attending: FAMILY MEDICINE
Payer: COMMERCIAL

## 2023-09-01 DIAGNOSIS — S29.9XXA TRAUMATIC INJURY OF RIB: ICD-10-CM

## 2023-09-01 PROCEDURE — 71111 X-RAY EXAM RIBS/CHEST4/> VWS: CPT | Performed by: FAMILY MEDICINE

## 2023-09-06 ENCOUNTER — TELEPHONE (OUTPATIENT)
Dept: FAMILY MEDICINE CLINIC | Facility: CLINIC | Age: 58
End: 2023-09-06
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
## 2023-09-09 ENCOUNTER — LAB ENCOUNTER (OUTPATIENT)
Dept: LAB | Age: 58
End: 2023-09-09
Attending: FAMILY MEDICINE
Payer: COMMERCIAL

## 2023-09-09 DIAGNOSIS — Z13.29 SCREENING FOR ENDOCRINE, NUTRITIONAL, METABOLIC AND IMMUNITY DISORDER: ICD-10-CM

## 2023-09-09 DIAGNOSIS — Z13.21 SCREENING FOR ENDOCRINE, NUTRITIONAL, METABOLIC AND IMMUNITY DISORDER: ICD-10-CM

## 2023-09-09 DIAGNOSIS — Z13.228 SCREENING FOR ENDOCRINE, NUTRITIONAL, METABOLIC AND IMMUNITY DISORDER: ICD-10-CM

## 2023-09-09 DIAGNOSIS — Z12.5 SCREENING FOR PROSTATE CANCER: ICD-10-CM

## 2023-09-09 DIAGNOSIS — Z13.0 SCREENING FOR ENDOCRINE, NUTRITIONAL, METABOLIC AND IMMUNITY DISORDER: ICD-10-CM

## 2023-09-09 LAB
ALBUMIN SERPL-MCNC: 4 G/DL (ref 3.4–5)
ALBUMIN/GLOB SERPL: 1.1 {RATIO} (ref 1–2)
ALP LIVER SERPL-CCNC: 64 U/L
ALT SERPL-CCNC: 23 U/L
ANION GAP SERPL CALC-SCNC: 8 MMOL/L (ref 0–18)
AST SERPL-CCNC: 15 U/L (ref 15–37)
BASOPHILS # BLD AUTO: 0.08 X10(3) UL (ref 0–0.2)
BASOPHILS NFR BLD AUTO: 1.1 %
BILIRUB SERPL-MCNC: 0.9 MG/DL (ref 0.1–2)
BUN BLD-MCNC: 15 MG/DL (ref 7–18)
CALCIUM BLD-MCNC: 9 MG/DL (ref 8.5–10.1)
CHLORIDE SERPL-SCNC: 107 MMOL/L (ref 98–112)
CHOLEST SERPL-MCNC: 176 MG/DL (ref ?–200)
CO2 SERPL-SCNC: 24 MMOL/L (ref 21–32)
COMPLEXED PSA SERPL-MCNC: 4.12 NG/ML (ref ?–4)
CREAT BLD-MCNC: 0.96 MG/DL
EGFRCR SERPLBLD CKD-EPI 2021: 91 ML/MIN/1.73M2 (ref 60–?)
EOSINOPHIL # BLD AUTO: 0.42 X10(3) UL (ref 0–0.7)
EOSINOPHIL NFR BLD AUTO: 5.8 %
ERYTHROCYTE [DISTWIDTH] IN BLOOD BY AUTOMATED COUNT: 13.6 %
FASTING PATIENT LIPID ANSWER: YES
FASTING STATUS PATIENT QL REPORTED: YES
GLOBULIN PLAS-MCNC: 3.7 G/DL (ref 2.8–4.4)
GLUCOSE BLD-MCNC: 97 MG/DL (ref 70–99)
HCT VFR BLD AUTO: 46.8 %
HDLC SERPL-MCNC: 48 MG/DL (ref 40–59)
HGB BLD-MCNC: 15.3 G/DL
IMM GRANULOCYTES # BLD AUTO: 0.02 X10(3) UL (ref 0–1)
IMM GRANULOCYTES NFR BLD: 0.3 %
LDLC SERPL CALC-MCNC: 108 MG/DL (ref ?–100)
LYMPHOCYTES # BLD AUTO: 2.3 X10(3) UL (ref 1–4)
LYMPHOCYTES NFR BLD AUTO: 32 %
MCH RBC QN AUTO: 29 PG (ref 26–34)
MCHC RBC AUTO-ENTMCNC: 32.7 G/DL (ref 31–37)
MCV RBC AUTO: 88.6 FL
MONOCYTES # BLD AUTO: 0.67 X10(3) UL (ref 0.1–1)
MONOCYTES NFR BLD AUTO: 9.3 %
NEUTROPHILS # BLD AUTO: 3.7 X10 (3) UL (ref 1.5–7.7)
NEUTROPHILS # BLD AUTO: 3.7 X10(3) UL (ref 1.5–7.7)
NEUTROPHILS NFR BLD AUTO: 51.5 %
NONHDLC SERPL-MCNC: 128 MG/DL (ref ?–130)
OSMOLALITY SERPL CALC.SUM OF ELEC: 289 MOSM/KG (ref 275–295)
PLATELET # BLD AUTO: 249 10(3)UL (ref 150–450)
POTASSIUM SERPL-SCNC: 4 MMOL/L (ref 3.5–5.1)
PROT SERPL-MCNC: 7.7 G/DL (ref 6.4–8.2)
RBC # BLD AUTO: 5.28 X10(6)UL
SODIUM SERPL-SCNC: 139 MMOL/L (ref 136–145)
T4 FREE SERPL-MCNC: 0.8 NG/DL (ref 0.8–1.7)
TRIGL SERPL-MCNC: 111 MG/DL (ref 30–149)
TSI SER-ACNC: 2.04 MIU/ML (ref 0.36–3.74)
VLDLC SERPL CALC-MCNC: 19 MG/DL (ref 0–30)
WBC # BLD AUTO: 7.2 X10(3) UL (ref 4–11)

## 2023-09-09 PROCEDURE — 84153 ASSAY OF PSA TOTAL: CPT | Performed by: FAMILY MEDICINE

## 2023-09-09 PROCEDURE — 80050 GENERAL HEALTH PANEL: CPT | Performed by: FAMILY MEDICINE

## 2023-09-09 PROCEDURE — 80061 LIPID PANEL: CPT | Performed by: FAMILY MEDICINE

## 2023-09-09 PROCEDURE — 84439 ASSAY OF FREE THYROXINE: CPT | Performed by: FAMILY MEDICINE

## 2023-09-19 ENCOUNTER — TELEPHONE (OUTPATIENT)
Dept: FAMILY MEDICINE CLINIC | Facility: CLINIC | Age: 58
End: 2023-09-19

## 2023-09-19 NOTE — TELEPHONE ENCOUNTER
----- Message from Rubén Gee MD sent at 9/19/2023 11:31 AM CDT -----  Stable labs, but PSA is elevated, should see [facility].

## 2023-10-16 ENCOUNTER — OFFICE VISIT (OUTPATIENT)
Dept: SURGERY | Facility: CLINIC | Age: 58
End: 2023-10-16

## 2023-10-16 DIAGNOSIS — R82.90 URINE FINDING: ICD-10-CM

## 2023-10-16 DIAGNOSIS — R97.20 ELEVATED PSA: Primary | ICD-10-CM

## 2023-10-16 LAB
APPEARANCE: CLEAR
BILIRUBIN: NEGATIVE
GLUCOSE (URINE DIPSTICK): NEGATIVE MG/DL
KETONES (URINE DIPSTICK): NEGATIVE MG/DL
LEUKOCYTES: NEGATIVE
MULTISTIX LOT#: ABNORMAL NUMERIC
NITRITE, URINE: NEGATIVE
PH, URINE: 6 (ref 4.5–8)
PROTEIN (URINE DIPSTICK): NEGATIVE MG/DL
SPECIFIC GRAVITY: 1.01 (ref 1–1.03)
URINE-COLOR: YELLOW
UROBILINOGEN,SEMI-QN: 0.2 MG/DL (ref 0–1.9)
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
## 2023-10-18 ENCOUNTER — TELEPHONE (OUTPATIENT)
Dept: SURGERY | Facility: CLINIC | Age: 58
End: 2023-10-18

## 2023-10-18 DIAGNOSIS — R82.90 URINE FINDING: Primary | ICD-10-CM

## 2023-10-18 NOTE — TELEPHONE ENCOUNTER
Patient notified to leave urine for Micro and culture. States he has a lab appt tomorrow and will do it then.

## 2023-10-19 ENCOUNTER — LAB ENCOUNTER (OUTPATIENT)
Dept: LAB | Age: 58
End: 2023-10-19
Attending: FAMILY MEDICINE
Payer: COMMERCIAL

## 2023-10-19 DIAGNOSIS — R97.20 ELEVATED PSA: ICD-10-CM

## 2023-10-19 DIAGNOSIS — R82.90 URINE FINDING: ICD-10-CM

## 2023-10-19 LAB
PSA FREE MFR SERPL: 15 %
PSA FREE SERPL-MCNC: 0.49 NG/ML
PSA SERPL-MCNC: 3.3 NG/ML (ref ?–4)

## 2023-10-19 PROCEDURE — 81015 MICROSCOPIC EXAM OF URINE: CPT

## 2023-10-19 PROCEDURE — 87086 URINE CULTURE/COLONY COUNT: CPT

## 2023-10-19 PROCEDURE — 84153 ASSAY OF PSA TOTAL: CPT

## 2023-10-19 PROCEDURE — 84154 ASSAY OF PSA FREE: CPT

## 2023-11-06 ENCOUNTER — HOSPITAL ENCOUNTER (OUTPATIENT)
Dept: CT IMAGING | Age: 58
Discharge: HOME | End: 2023-11-06
Attending: PHYSICIAN ASSISTANT
Payer: COMMERCIAL

## 2023-11-06 DIAGNOSIS — R31.29 MICROHEMATURIA: ICD-10-CM

## 2023-11-06 PROCEDURE — 76377 3D RENDER W/INTRP POSTPROCES: CPT | Performed by: PHYSICIAN ASSISTANT

## 2023-11-06 PROCEDURE — 74178 CT ABD&PLV WO CNTR FLWD CNTR: CPT | Performed by: PHYSICIAN ASSISTANT

## 2023-11-06 PROCEDURE — 82565 ASSAY OF CREATININE: CPT

## 2023-11-06 RX ORDER — IOHEXOL 350 MG/ML
100 INJECTION, SOLUTION INTRAVENOUS
Status: COMPLETED | OUTPATIENT
Start: 2023-11-06 | End: 2023-11-06

## 2023-11-06 RX ADMIN — IOHEXOL 100 ML: 350 INJECTION, SOLUTION INTRAVENOUS at 16:32:00

## 2023-11-08 LAB
CREAT BLD-MCNC: 1.1 MG/DL
EGFRCR SERPLBLD CKD-EPI 2021: 77 ML/MIN/1.73M2 (ref 60–?)

## 2023-11-10 ENCOUNTER — TELEPHONE (OUTPATIENT)
Dept: SURGERY | Facility: CLINIC | Age: 58
End: 2023-11-10

## 2023-11-10 NOTE — TELEPHONE ENCOUNTER
Patient indicates he received a letter to call and schedule Cysto procedure, please call at [phone],[facility].

## 2023-12-28 ENCOUNTER — TELEPHONE (OUTPATIENT)
Dept: SURGERY | Facility: CLINIC | Age: 58
End: 2023-12-28

## 2023-12-28 NOTE — TELEPHONE ENCOUNTER
Per pt asking if there is a urine or blood test he can do to determine if having a cystoscopy is absolutely necessary.  Please advise

## 2023-12-29 NOTE — TELEPHONE ENCOUNTER
Called pt and discussed the need for the Cystoscopy. Pt verbalized understanding. Agreeable to proceed.

## 2024-01-19 ENCOUNTER — TELEPHONE (OUTPATIENT)
Dept: SURGERY | Facility: CLINIC | Age: 59
End: 2024-01-19

## 2024-01-29 NOTE — TELEPHONE ENCOUNTER
This encounter is now closed.     RN called patient to offer cysto appt. He preferred office cysto as he is taking valium. Appt offered on 3/5 Tues at 1PM. Taking valium 30 min before procedure and will have a family member to drive. All questions answered. He agreed to plans.

## 2024-03-05 ENCOUNTER — PROCEDURE (OUTPATIENT)
Dept: SURGERY | Facility: CLINIC | Age: 59
End: 2024-03-05
Payer: COMMERCIAL

## 2024-03-05 DIAGNOSIS — R82.90 URINE FINDING: Primary | ICD-10-CM

## 2024-03-05 LAB
APPEARANCE: CLEAR
BILIRUBIN: NEGATIVE
GLUCOSE (URINE DIPSTICK): NEGATIVE MG/DL
KETONES (URINE DIPSTICK): NEGATIVE MG/DL
LEUKOCYTES: NEGATIVE
MULTISTIX LOT#: ABNORMAL NUMERIC
NITRITE, URINE: NEGATIVE
PH, URINE: 6.5 (ref 4.5–8)
PROTEIN (URINE DIPSTICK): NEGATIVE MG/DL
SPECIFIC GRAVITY: 1.01 (ref 1–1.03)
URINE-COLOR: YELLOW
UROBILINOGEN,SEMI-QN: 0.2 MG/DL (ref 0–1.9)

## 2024-03-05 PROCEDURE — 52000 CYSTOURETHROSCOPY: CPT | Performed by: SURGERY

## 2024-03-05 PROCEDURE — 81003 URINALYSIS AUTO W/O SCOPE: CPT | Performed by: SURGERY

## 2024-03-05 RX ORDER — TAMSULOSIN HYDROCHLORIDE 0.4 MG/1
0.4 CAPSULE ORAL EVERY EVENING
Qty: 90 CAPSULE | Refills: 6 | Status: SHIPPED | OUTPATIENT
Start: 2024-03-05

## 2024-03-05 RX ORDER — ESOMEPRAZOLE MAGNESIUM 40 MG/1
40 CAPSULE, DELAYED RELEASE ORAL
COMMUNITY
Start: 2024-02-21

## 2024-03-05 RX ORDER — CIPROFLOXACIN 500 MG/1
500 TABLET, FILM COATED ORAL ONCE
Status: COMPLETED | OUTPATIENT
Start: 2024-03-05 | End: 2024-03-05

## 2024-03-05 RX ADMIN — CIPROFLOXACIN 500 MG: 500 TABLET, FILM COATED ORAL at 13:19:00

## 2024-03-05 NOTE — PROCEDURES
Clinic Procedure Note    INDICATIONS:   Mateo Ramirez is a 58 year old male with history of GERD seen by EDDIE Amaro for elevated PSA up to 4.12.  Repeat PSA was down to 3.30.  Urinalysis shows 6-10 RBC. CT urogram 2023 was normal with mild prostate enlargement ~60cc.    PROCEDURE:       1. Flexible cystourethroscopy    DATE OF PROCEDURE: 3/5/2024     PRE-PROCEDURE DIAGNOSIS: Microscopic hematuria    POST-PROCEDURE DIAGNOSIS: Same     SURGEON: Mateo Whitney MD    FINDINGS:    Urethra: Orthotopic meatus, normal caliber urethra throughout without lesions    Prostate: Moderately enlarged prostate with trilobar hyperplasia, large median lobe with intravesical protrusion    Bladder: Normal mucosa with no papillary lesions or erythema, moderate trabeculation    Ureteral orifices: Orthotopic    Other findings: None    PROCEDURE:   Patient was brought to the procedure suite and a time-out was performed identifiying the patient,  and procedure to be performed. The risks and benefits of the procedure were once again discussed with the patient including bleeding, infection, and dysuria. The patient agreed to proceed. The patient did not have any signs or symptoms of active UTI.    He was placed in supine position on the table and the penis was prepped and draped in the standard sterile fashion. Urojet was instilled per urethra for local anesthetic effect. A flexible cystoscope was inserted per urethra. The bladder was fully inspected (including retroflexion) and showed findings as above. Both ureteral orifices were orthotopic. The prostate was carefully viewed on removal of the scope, with findings as above. The scope was then carefully removed.    There were no complications and the patient tolerated the procedure well.    IMPRESSION:  Mateo Ramirez is a 58 year old male with history of GERD seen by EDDIE Amaro for elevated PSA up to 4.12.  Repeat PSA was down to 3.30.  Urinalysis shows 6-10 RBC.  CT urogram 11/6/2023 was normal with mild prostate enlargement ~60cc.    Cystoscopy today shows a moderately enlarged, obstructive prostate with a large intravesical median lobe.  I discussed starting tamsulosin versus TURP.  He elects to proceed with tamsulosin.    PLAN:   -Start tamsulosin 0.4 mg daily  -Return to clinic in 3 months for symptom check      Mateo Whitney MD  Staff Urologist  [facility]  Office: [phone]

## 2024-05-17 RX ORDER — ESOMEPRAZOLE MAGNESIUM 40 MG/1
40 CAPSULE, DELAYED RELEASE ORAL
Qty: 90 CAPSULE | Refills: 0 | Status: SHIPPED | OUTPATIENT
Start: 2024-05-17

## 2024-05-17 NOTE — TELEPHONE ENCOUNTER
Medication(s) to Refill:   Requested Prescriptions     Pending Prescriptions Disp Refills    ESOMEPRAZOLE MAGNESIUM 40 MG Oral Capsule Delayed Release [Pharmacy Med Name: Esomeprazole Magnesium Dr 40 Mg Cap Nort] 90 capsule 0     Sig: Take 1 capsule by mouth every morning before breakfast.         Reason for Medication Refill being sent to Provider / Reason Protocol Failed:  [] 90 day refill has already been granted  [] Blood Pressure out of range  [] Labs Abnormal/over due  [] Medication not previously prescribed by Provider  [] Non-Protocol Medication  [] Controlled Substance   [] Due for appointment- no future appointment scheduled  [] No Follow up specified      Last Time Medication was Filled:  2/21/24      Last Office Visit with PCP: 8/31/23    When Patient was Due Back to the Office:    (from when PCP last addressed condition)    Future Appointments:  No future appointments.      Last Blood Pressures:  BP Readings from Last 2 Encounters:   08/31/23 120/84   10/14/22 120/84         Action taken:  [] Refill approved per protocol  [] Routing to provider for approval

## 2024-08-16 RX ORDER — ESOMEPRAZOLE MAGNESIUM 40 MG/1
40 CAPSULE, DELAYED RELEASE ORAL
Qty: 90 CAPSULE | Refills: 0 | Status: SHIPPED | OUTPATIENT
Start: 2024-08-16

## 2024-09-26 ENCOUNTER — OFFICE VISIT (OUTPATIENT)
Dept: FAMILY MEDICINE CLINIC | Facility: CLINIC | Age: 59
End: 2024-09-26
Payer: COMMERCIAL

## 2024-09-26 DIAGNOSIS — S60.552A FOREIGN BODY OF LEFT HAND, INITIAL ENCOUNTER: Primary | ICD-10-CM

## 2024-09-26 PROCEDURE — 99203 OFFICE O/P NEW LOW 30 MIN: CPT | Performed by: NURSE PRACTITIONER

## 2024-09-26 NOTE — PROGRESS NOTES
CHIEF COMPLAINT:     Chief Complaint   Patient presents with    FB in Skin       HPI:     Mateo Ramirez is a 59 year old male who presents with concerns of possible metal splinter in left hand. States 2 weeks ago slid metal over on a forklift and felt like he got a splinter from the metal. He has been attempting to remove the splinter at home with tweezers, cannot get it out but has sensation that there is something in the hand. Came to clinic today to have splinter removed. States the splinter is no longer visible.   Current Outpatient Medications   Medication Sig Dispense Refill    ESOMEPRAZOLE MAGNESIUM 40 MG Oral Capsule Delayed Release Take 1 capsule by mouth every morning before breakfast. 90 capsule 0    tamsulosin 0.4 MG Oral Cap Take 1 capsule (0.4 mg total) by mouth every evening. 90 capsule 6    diazePAM (VALIUM) 10 MG Oral Tab Take 1 tablet (10 mg total) by mouth See Admin Instructions. Take 1-2 tablets by mouth 20-30 minutes before procedure. 2 tablet 0      Past Medical History:    Esophageal reflux    Heart burn      Social History:  Social History     Socioeconomic History    Marital status:    Tobacco Use    Smoking status: Never    Smokeless tobacco: Never   Vaping Use    Vaping status: Never Used   Substance and Sexual Activity    Alcohol use: Yes     Alcohol/week: 4.0 standard drinks of alcohol     Types: 2 Glasses of wine, 2 Cans of beer per week     Comment: soc    Drug use: No        REVIEW OF SYSTEMS:   GENERAL: feels well otherwise, no fever, no chills.  SKIN: as above.      EXAM:     GENERAL: well developed, well nourished,in no apparent distress  LEFT HAND: no visible splinter. There is small calloused area of skin near hypothenar eminence where pt states he has been trying to remove splinter. There is no erythema or drainage. Full active ROM of fingers and wrist.     ASSESSMENT AND PLAN:     ASSESSMENT:  Encounter Diagnosis   Name Primary?    Foreign body of left hand, initial  encounter Yes       PLAN:   There is no visible foreign body externally, however pt has sensation of foreign body underneath the skin. I recommended pt be seen today at higher level of care for xray to determine if foreign body is still present and possible removal. Recommended [facility] or [facility], pt states he is uncertain which location he will go. Pt asked if he should continue to try to remove it himself, I advised patient he should not do this as it could lead to infection or further complications. I recommended he be seen at higher level of care for further evaluation. Pt verbalized understanding.     Requested Prescriptions      No prescriptions requested or ordered in this encounter

## 2024-09-27 ENCOUNTER — HOSPITAL ENCOUNTER (OUTPATIENT)
Age: 59
Discharge: HOME | End: 2024-09-27
Payer: COMMERCIAL

## 2024-09-27 ENCOUNTER — APPOINTMENT (OUTPATIENT)
Dept: GENERAL RADIOLOGY | Age: 59
End: 2024-09-27
Attending: PHYSICIAN ASSISTANT
Payer: COMMERCIAL

## 2024-09-27 VITALS
OXYGEN SATURATION: 98 % | HEART RATE: 77 BPM | RESPIRATION RATE: 20 BRPM | BODY MASS INDEX: 25.2 KG/M2 | WEIGHT: 180 LBS | DIASTOLIC BLOOD PRESSURE: 90 MMHG | TEMPERATURE: 97 F | HEIGHT: 71 IN | SYSTOLIC BLOOD PRESSURE: 140 MMHG

## 2024-09-27 DIAGNOSIS — T14.8XXA FOREIGN BODY IN SKIN: ICD-10-CM

## 2024-09-27 DIAGNOSIS — R03.0 ELEVATED BLOOD PRESSURE READING IN OFFICE WITHOUT DIAGNOSIS OF HYPERTENSION: Primary | ICD-10-CM

## 2024-09-27 PROCEDURE — 99213 OFFICE O/P EST LOW 20 MIN: CPT

## 2024-09-27 PROCEDURE — 90471 IMMUNIZATION ADMIN: CPT

## 2024-09-27 PROCEDURE — 99203 OFFICE O/P NEW LOW 30 MIN: CPT

## 2024-09-27 PROCEDURE — 73130 X-RAY EXAM OF HAND: CPT | Performed by: PHYSICIAN ASSISTANT

## 2024-09-27 RX ORDER — CELECOXIB 200 MG/1
200 CAPSULE ORAL DAILY
COMMUNITY
Start: 2024-09-12

## 2024-09-27 NOTE — ED INITIAL ASSESSMENT (HPI)
Pt with co fb to the skin. Thinks a metal flake is in the left hand. This happened a couple of weeks ago.

## 2024-09-27 NOTE — DISCHARGE INSTRUCTIONS
Please return to the ER/clinic if symptoms worsen. Follow-up with your PCP in 24-48 hours as needed.    Wash the hands daily and apply the Polysporin.  Look for any signs and symptoms of infection: Redness, swelling, streaking, drainage or fevers.  Follow-up with your PCP for further evaluation and treatment.  Follow the Dash dietary plan and log your blood pressures at different intervals to take to your PCP.

## 2024-09-27 NOTE — ED PROVIDER NOTES
Patient Seen in: [facility]      History     Chief Complaint   Patient presents with    FB in Skin     Stated Complaint: metal inj to hand    Subjective:   HPI    59-year-old male here with complaint of a potential foreign body in his left hand.  Patient reports that he thinks it is a small piece of metal that occurred a couple weeks ago.  Patient reports that he has been digging at it with a needle.  Patient needs a tetanus shot.  Patient denies any further injury trauma or LOC.  Patient denies chest pain, shortness of breath, cough, abdominal pain, nausea, vomiting or diarrhea.  Patient denies any prior history of hypertension.  Patient denies headache or blurry vision.  Patient reports that he is nervous.  Afebrile.    Objective:   No pertinent past medical history.            No pertinent past surgical history.            The patient's medication list, past medical history and social history elements  as listed in today's nurse's notes are reviewed and agree.   The patient's family history is reviewed and is noncontributory to the presenting problem, except as indicated as above.     No pertinent social history.            Review of Systems    Positive for stated Chief Complaint: FB in Skin    Other systems are as noted in HPI.  Constitutional and vital signs reviewed.      All other systems reviewed and negative except as noted above.    Physical Exam     ED Triage Vitals [09/27/24 1512]   BP (!) 140/102   Pulse 77   Resp 20   Temp 97 °F (36.1 °C)   Temp src Temporal   SpO2 98 %   O2 Device None (Room air)       Current Vitals:   Vital Signs  BP: 140/90  Pulse: 77  Resp: 20  Temp: 97 °F (36.1 °C)  Temp src: Temporal    Oxygen Therapy  SpO2: 98 %  O2 Device: None (Room air)            Physical Exam  Vitals and nursing note reviewed.   Constitutional:       Appearance: Normal appearance. He is well-developed.   HENT:      Head: Normocephalic.      Right Ear: External ear normal.      Left Ear:  External ear normal.      Nose: Nose normal.      Mouth/Throat:      Mouth: Mucous membranes are moist.   Eyes:      Conjunctiva/sclera: Conjunctivae normal.      Pupils: Pupils are equal, round, and reactive to light.   Cardiovascular:      Rate and Rhythm: Normal rate and regular rhythm.      Heart sounds: Normal heart sounds.   Pulmonary:      Effort: Pulmonary effort is normal.      Breath sounds: Normal breath sounds.   Musculoskeletal:      Cervical back: Normal range of motion and neck supple.   Skin:     General: Skin is warm.      Capillary Refill: Capillary refill takes less than 2 seconds.      Findings: Wound present.      Comments: L hand base of the 5th: callused area noted: no erythema/warmth/fluctuance/streaking: FROM< N/V intact, strength 5/5   Neurological:      General: No focal deficit present.      Mental Status: He is alert and oriented to person, place, and time.   Psychiatric:         Mood and Affect: Mood normal.         Behavior: Behavior normal.         Thought Content: Thought content normal.         Judgment: Judgment normal.             ED Course   I personally reviewed the xray images and and saw these findings: possible FB  XR HAND (MIN 3 VIEWS), LEFT (CPT=73130)    Result Date: 9/27/2024  PROCEDURE:  XR HAND (MIN 3 VIEWS), LEFT (CPT=73130)  TECHNIQUE:  Three views of the left hand were obtained.  COMPARISON:  None.  INDICATIONS:  possible metal FB at base of 5th L hand  PATIENT STATED HISTORY: (As transcribed by Technologist)  Metal sliver in hand on medial side near 5th metacarpal.    FINDINGS:  There is a 1-2 mm density in the soft tissues at the ulnar side of the 5th metacarpal bone.  This is not metallic density but could represent other retained foreign body depending on specific clinical history.  Nonspecific soft tissue calcification could also have this appearance.  There is no acute fracture.  There is a subchondral cyst in the proximal pole of the scaphoid bone.  There is  mild joint space narrowing and marginal osteophyte formation distal interphalangeal joint of 3rd  digit.            CONCLUSION:  1. 1-2 mm density soft tissues at the ulnar aspect 5th metacarpal bone could represent nonspecific soft tissue calcification.  Non metallic retained foreign body could have this appearance. 2. 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    LOCATION:  [place]   Dictated by (CST): Nate Box MD on 9/27/2024 at 3:39 PM     Finalized by (CST): Nate Box MD on 9/27/2024 at 3:41 PM          Site:L hand  WoundCare:Lido 3ml: splinter forcep used to remove piece/shard?polysporin/bandaid  N/V intact:Yes  After discussing the risks, benefits and alternatives patient expresses understanding and verbal consent.      NOTE: Discussed the fact that I removed a shard there may be some more remaining however it has been several weeks and there is no infection.  Patient will just keep an eye on it wash hands and use of Polysporin.  Patient also denies any prior history of hypertension.  Patient will monitor it at home.  Patient reports that he is nervous    MDM   Clinical Impression: FB removed L hand/elevated blood pressure reading without diagnosis of HTN  Course of Treatment:   Wash the hands daily and apply the Polysporin.  Look for any signs and symptoms of infection: Redness, swelling, streaking, drainage or fevers.  Follow-up with your PCP for further evaluation and treatment.  Follow the Dash dietary plan and log your blood pressures at different intervals to take to your PCP.    The patient is encouraged to return if any concerning symptoms arise. Additional verbal discharge instructions are given and discussed. Discharge medications are discussed. The patient is in good condition throughout the visit today and remains so upon discharge. I discuss the plan of care with the patient, who expresses understanding. All questions and concerns are addressed to the patient's satisfaction prior  to discharge today.  Previous conversations with PCP and charts were reviewed.                                           Disposition and Plan     Clinical Impression:  1. Elevated blood pressure reading in office without diagnosis of hypertension    2. Foreign body in skin         Disposition:  Discharge  9/27/2024  3:57 pm    Follow-up:  Nima Villegas MD  [id number]  [facility] [address]  [phone]                Medications Prescribed:  Current Discharge Medication List

## 2024-11-14 RX ORDER — ESOMEPRAZOLE MAGNESIUM 40 MG/1
40 CAPSULE, DELAYED RELEASE ORAL
Qty: 30 CAPSULE | Refills: 0 | Status: SHIPPED | OUTPATIENT
Start: 2024-11-14 | End: 2024-12-16

## 2024-11-18 RX ORDER — ESOMEPRAZOLE MAGNESIUM 40 MG/1
40 CAPSULE, DELAYED RELEASE ORAL
Qty: 90 CAPSULE | Refills: 0 | OUTPATIENT
Start: 2024-11-18

## 2024-12-11 ENCOUNTER — TELEPHONE (OUTPATIENT)
Dept: FAMILY MEDICINE CLINIC | Facility: CLINIC | Age: 59
End: 2024-12-11

## 2024-12-11 NOTE — TELEPHONE ENCOUNTER
Patient called requesting an order for MRI of the LT Shoulder. Said he has seen Dr. Villegas about this before but for the RT Shoulder. Pt unable to lift or move arm. Aware he might need to be seen again to discuss LT.

## 2024-12-16 RX ORDER — ESOMEPRAZOLE MAGNESIUM 40 MG/1
40 CAPSULE, DELAYED RELEASE ORAL
Qty: 30 CAPSULE | Refills: 0 | Status: SHIPPED | OUTPATIENT
Start: 2024-12-16

## 2024-12-16 NOTE — TELEPHONE ENCOUNTER
Medication(s) to Refill:   Requested Prescriptions     Pending Prescriptions Disp Refills    ESOMEPRAZOLE MAGNESIUM 40 MG Oral Capsule Delayed Release [Pharmacy Med Name: Esomeprazole Magnesium Dr 40 Mg Cap Nort] 30 capsule 0     Sig: Take 1 capsule by mouth every morning before breakfast.         Reason for Medication Refill being sent to Provider / Reason Protocol Failed:  [] 90 day refill has already been granted  [] Blood Pressure out of range  [] Labs Abnormal/over due  [] Medication not previously prescribed by Provider  [] Non-Protocol Medication  [] Controlled Substance   [] Due for appointment- no future appointment scheduled  [] No Follow up specified      Last Time Medication was Filled:  11/14/24      Last Office Visit with PCP: 8/31/23    When Patient was Due Back to the Office:    (from when PCP last addressed condition)    Future Appointments:  Future Appointments   Date Time Provider Department Center   12/17/2024  4:00 PM Nima Villegas MD [facility]   1/21/2025  3:40 PM Bandar Kidd MD [facility]         Last Blood Pressures:  BP Readings from Last 2 Encounters:   09/27/24 140/90   08/31/23 120/84         Action taken:  [] Refill approved per protocol  [] Routing to provider for approval

## 2024-12-17 ENCOUNTER — OFFICE VISIT (OUTPATIENT)
Dept: FAMILY MEDICINE CLINIC | Facility: CLINIC | Age: 59
End: 2024-12-17
Payer: COMMERCIAL

## 2024-12-17 VITALS
OXYGEN SATURATION: 97 % | RESPIRATION RATE: 18 BRPM | HEART RATE: 76 BPM | WEIGHT: 187 LBS | BODY MASS INDEX: 26.18 KG/M2 | HEIGHT: 71 IN | SYSTOLIC BLOOD PRESSURE: 154 MMHG | DIASTOLIC BLOOD PRESSURE: 110 MMHG

## 2024-12-17 DIAGNOSIS — M62.81 MUSCLE WEAKNESS OF LEFT UPPER EXTREMITY: ICD-10-CM

## 2024-12-17 DIAGNOSIS — Z12.5 SCREENING FOR PROSTATE CANCER: ICD-10-CM

## 2024-12-17 DIAGNOSIS — R03.0 ELEVATED BLOOD-PRESSURE READING WITHOUT DIAGNOSIS OF HYPERTENSION: ICD-10-CM

## 2024-12-17 DIAGNOSIS — M54.16 RIGHT LUMBAR RADICULITIS: ICD-10-CM

## 2024-12-17 DIAGNOSIS — Z00.00 ROUTINE GENERAL MEDICAL EXAMINATION AT HEALTH CARE FACILITY: Primary | ICD-10-CM

## 2024-12-17 PROCEDURE — 99396 PREV VISIT EST AGE 40-64: CPT | Performed by: FAMILY MEDICINE

## 2024-12-17 PROCEDURE — 3008F BODY MASS INDEX DOCD: CPT | Performed by: FAMILY MEDICINE

## 2024-12-17 PROCEDURE — 99214 OFFICE O/P EST MOD 30 MIN: CPT | Performed by: FAMILY MEDICINE

## 2024-12-17 PROCEDURE — 3080F DIAST BP >= 90 MM HG: CPT | Performed by: FAMILY MEDICINE

## 2024-12-17 PROCEDURE — 3077F SYST BP >= 140 MM HG: CPT | Performed by: FAMILY MEDICINE

## 2024-12-17 RX ORDER — LOSARTAN POTASSIUM AND HYDROCHLOROTHIAZIDE 12.5; 5 MG/1; MG/1
1 TABLET ORAL DAILY
Qty: 90 TABLET | Refills: 3 | Status: SHIPPED | OUTPATIENT
Start: 2024-12-17 | End: 2025-12-12

## 2024-12-17 NOTE — PROGRESS NOTES
HPI:    Mateo Ramirez is a 59 year old male who presents for Physical, Numbness (Left arm numbness last week ), and Sciatica (Sciatica pain on right side  )     Patient reports pain is not under control.   Location: left shoulder pain, he reports weakness with unable to raise for 24-48 hours, he reports no acute injury and feels like strength is restored with elevated blood pressure with history of questionable HTN vs. White coat HTN.   Patient reports overuse injury.   Patient describes pain as an ache and radiates to left anterior shoulder.   Patient reports OTC medication provides relief.   Patient reports symptoms are worse with movement, prolonged standing and sitting.   Patient reports pain has gotten worse.  Mild change in activity recently.   Patient reports   Patient reports no other associated symptoms.   Patient reports no bowel or bladder incontinences.       Past History:   He  has a past medical history of Esophageal reflux and Heart burn.   He  has a past surgical history that includes knee replacement surgery; knee arthroscopy; and colonoscopy (02/25/2020).   His family history is not on file.   He  reports that he has never smoked. He has never used smokeless tobacco. He reports current alcohol use of about 4.0 standard drinks of alcohol per week. He reports that he does not use drugs.     He is not on any long-term medications.   He has No Known Allergies.     Current Outpatient Medications on File Prior to Visit   Medication Sig    ESOMEPRAZOLE MAGNESIUM 40 MG Oral Capsule Delayed Release Take 1 capsule by mouth every morning before breakfast.    celecoxib 200 MG Oral Cap Take 1 capsule (200 mg total) by mouth daily.    tamsulosin 0.4 MG Oral Cap Take 1 capsule (0.4 mg total) by mouth every evening.     No current facility-administered medications on file prior to visit.         REVIEW OF SYSTEMS:   Patient denies shortness of breath, denies chest pain and denies any recent fevers or chills.     Patient reports no urinary complaints and denies headaches or visual disturbances.   Patient denies any abdominal pain at this time. Patient has no new skin lesions.  Patient reports no acute back pain and reports no dizziness or headaches.   Patient reports no visual disturbances and reports hearing has been about the same.   Patient reports no recent injury or trauma.               EXAM:    BP (!) 162/110   Pulse 76   Resp 18   Ht 5' 11\" (1.803 m)   Wt 187 lb (84.8 kg)   SpO2 97%   BMI 26.08 kg/m²  Estimated body mass index is 26.08 kg/m² as calculated from the following:    Height as of this encounter: 5' 11\" (1.803 m).    Weight as of this encounter: 187 lb (84.8 kg).    General Appearance:  Alert, cooperative, no distress, appears stated age   Head:  Normocephalic, without obvious abnormality, atraumatic   Eyes:  conjunctiva/cornea is not erythematous.        Nose: No nasal drainage.    Throat: No erythema    Neck: Supple, symmetrical, trachea midline, and normal ROM  thyroid: no obvious nodules   Back:   Symmetric, no curvature, ROM normal, no CVA tenderness   Lungs:   Clear to auscultation bilaterally, respirations unlabored   Chest Wall:  No tenderness or deformity   Heart:  Regular rate and rhythm, S1, S2 normal, no murmur,   Abdomen:   Soft, non-tender, bowel sounds active. No hernia.    Genitalia:     Rectal:     Extremities: Extremities normal, atraumatic, no cyanosis or edema   Pulses: 2+ and symmetric   Skin: Skin color, texture, turgor normal, no new rashes    Lymph nodes: No obvious cervical adenopathy.    Neurologic and psych: Normal speech, Alert and oriented x 3.   Normal mood, normal insight and judgment.    Left upper extremity strength is normal with no signs of weakness.   Right lumbar radiculitis.               ASSESSMENT AND PLAN:   1. Routine general medical examination at health care facility    - CBC With Differential With Platelet; Future  - Comp Metabolic Panel (14); Future  -  Lipid Panel; Future  - PSA Total, Screen; Future  - TSH and Free T4; Future    2. Screening for prostate cancer    - PSA Total, Screen; Future    3. Right lumbar radiculitis  -prn muscle relaxant    4. Muscle weakness of left upper extremity  -will check MRI to rule out infarct vs. TIA  - MRI BRAIN (W+WO) (CPT=70553); Future    5. Elevated blood-pressure reading without diagnosis of hypertension  -will start HTN rx.   - losartan-hydroCHLOROthiazide 50-12.5 MG Oral Tab; Take 1 tablet by mouth daily.  Dispense: 90 tablet; Refill: 3     Pt verbalized understanding and has no further questions at this time.  Over 40 minutes was spent with patient reviewing medication, reviewing labs, previous Neuro, ORTHO records, and medical plan.    Greater than 50% of visit spent on education and counseling.  Office Follow up visit: close follow up     Nima Villegas MD, 12/17/2024, 4:07 PM     Note to patient: The 21st Century Cures Act makes medical notes like these available to patients in the interest of transparency. However, this is a medical document intended as peer to peer communication. It is written in medical language and may contain abbreviations or verbiage that are unfamiliar. It may appear blunt or direct. Medical documents are intended to carry relevant information, facts as evident, and the clinical opinion of the practitioner who signs the document.

## 2024-12-18 ENCOUNTER — HOSPITAL ENCOUNTER (OUTPATIENT)
Dept: MRI IMAGING | Facility: HOSPITAL | Age: 59
Discharge: HOME | End: 2024-12-18
Attending: FAMILY MEDICINE
Payer: COMMERCIAL

## 2024-12-18 ENCOUNTER — TELEPHONE (OUTPATIENT)
Dept: FAMILY MEDICINE CLINIC | Facility: CLINIC | Age: 59
End: 2024-12-18

## 2024-12-18 DIAGNOSIS — M62.81 MUSCLE WEAKNESS OF LEFT UPPER EXTREMITY: ICD-10-CM

## 2024-12-18 DIAGNOSIS — M54.16 RIGHT LUMBAR RADICULITIS: Primary | ICD-10-CM

## 2024-12-18 PROCEDURE — 70553 MRI BRAIN STEM W/O & W/DYE: CPT | Performed by: FAMILY MEDICINE

## 2024-12-18 PROCEDURE — A9575 INJ GADOTERATE MEGLUMI 0.1ML: HCPCS | Performed by: FAMILY MEDICINE

## 2024-12-18 RX ORDER — GADOTERATE MEGLUMINE 376.9 MG/ML
20 INJECTION INTRAVENOUS
Status: COMPLETED | OUTPATIENT
Start: 2024-12-18 | End: 2024-12-18

## 2024-12-18 RX ORDER — CYCLOBENZAPRINE HCL 10 MG
10 TABLET ORAL
Qty: 20 TABLET | Refills: 0 | Status: SHIPPED | OUTPATIENT
Start: 2024-12-18

## 2024-12-18 RX ADMIN — GADOTERATE MEGLUMINE 17 ML: 376.9 INJECTION INTRAVENOUS at 15:23:00

## 2024-12-18 NOTE — TELEPHONE ENCOUNTER
Patient was seen yesterday. He is reporting severe right sciatic pain this morning, and wondering if there is anything that can be prescribed for him. He could barely walk this morning. Took some ibuprofen and tried heat with a little relief. No history of lower back/sciatic issues.  Patient will be scheduling his MRI brain today.

## 2024-12-18 NOTE — TELEPHONE ENCOUNTER
Called and spoke with pt. Notified rx for Cyclobenzaprine was sent in and cautioned sedation/drowsiness. Pt stated he will plan to take at night. Advised to keep us updated on symptoms. Pt verbalizes understanding and is agreeable to plan.

## 2024-12-20 ENCOUNTER — SPINE CENTER NAVIGATION (OUTPATIENT)
Age: 59
End: 2024-12-20

## 2024-12-20 NOTE — PROGRESS NOTES
Spine Center Referral Navigation Encounter Note    Referred by: Nima Villegas MD    Imaging: None   If imaging done at an external facility, instructed patient to bring disc of MRI to appointment.     Previously Seen Spine Care Providers: None    Referred to: Miguel Smith PA-C in Pain Management     Information below is patient reported.     [facility]  Are you currently experiencing any of the following symptoms?      No    Is your condition due to an injury that occurred at work or is your care for this condition being coordinated by Worker's Compensation?      No    Are you looking for a second surgical opinion?      No    Have you had surgery on your spine (neck or back) in the last 12 months?      No    In the last several weeks, have you experienced weakness or balance issues that have caused falls or difficulty lifting your legs or feet (lower body) and/or weakness that has caused you to drop items or caused changes in handwriting (upper body)?      No    In the last 3 months, have you had spine injections AND physical therapy for your back or neck that did not improve your symptoms?      No    : Patient needs to be seen by non-surgical team. Does patient require a new visit or established visit?      New patient visit    Are you closer to [place] or [facility]?      [facility]      Patient needed 3:30 or later. Patient was told he will need to arrive 15 minutes prior to appointment to allow time for paperwork.

## 2024-12-26 ENCOUNTER — TELEPHONE (OUTPATIENT)
Dept: FAMILY MEDICINE CLINIC | Facility: CLINIC | Age: 59
End: 2024-12-26

## 2024-12-26 NOTE — TELEPHONE ENCOUNTER
Patient called in and said that he has his BP pressure readings but was having problems sending the information thru [facility]      12/24  134 86        2pm  132 89  P 95          12/25  133 100  P 72       129 104  P 81    12/26   126 93  P 85      Patient would like to know how long he needs to be checking his blood pressures?

## 2024-12-30 RX ORDER — CELECOXIB 200 MG/1
200 CAPSULE ORAL DAILY
Qty: 90 CAPSULE | Refills: 0 | Status: SHIPPED | OUTPATIENT
Start: 2024-12-30

## 2024-12-30 NOTE — TELEPHONE ENCOUNTER
Ok to check BP 3 times week for now and should still [facility] because of chronic changes noted on MRI and possible history of TIA.

## 2024-12-30 NOTE — TELEPHONE ENCOUNTER
LOV- 12/17/2024     RF- 9/12/2024     Last ordered: 3 months ago (9/27/2024) by Reported External/Patient     Last refill: 9/12/2024

## 2025-01-06 ENCOUNTER — OFFICE VISIT (OUTPATIENT)
Dept: PAIN CLINIC | Facility: CLINIC | Age: 60
End: 2025-01-06
Payer: COMMERCIAL

## 2025-01-06 VITALS
WEIGHT: 187 LBS | BODY MASS INDEX: 26 KG/M2 | SYSTOLIC BLOOD PRESSURE: 126 MMHG | OXYGEN SATURATION: 98 % | HEART RATE: 104 BPM | DIASTOLIC BLOOD PRESSURE: 102 MMHG

## 2025-01-06 DIAGNOSIS — M54.16 RIGHT LUMBAR RADICULITIS: Primary | ICD-10-CM

## 2025-01-06 PROCEDURE — 3074F SYST BP LT 130 MM HG: CPT | Performed by: PHYSICIAN ASSISTANT

## 2025-01-06 PROCEDURE — 3080F DIAST BP >= 90 MM HG: CPT | Performed by: PHYSICIAN ASSISTANT

## 2025-01-06 PROCEDURE — 99204 OFFICE O/P NEW MOD 45 MIN: CPT | Performed by: PHYSICIAN ASSISTANT

## 2025-01-06 RX ORDER — METHYLPREDNISOLONE 4 MG/1
TABLET ORAL
Qty: 21 TABLET | Refills: 0 | Status: SHIPPED | OUTPATIENT
Start: 2025-01-06

## 2025-01-06 NOTE — PROGRESS NOTES
Subjective:   Patient ID: Mateo Ramirez is a 59 year old male.    HPI    History/Other:   Review of Systems  Current Outpatient Medications   Medication Sig Dispense Refill   • CELECOXIB 200 MG Oral Cap TAKE ONE CAPSULE BY MOUTH ONE TIME DAILY 90 capsule 0   • cyclobenzaprine 10 MG Oral Tab Take 1 tablet (10 mg total) by mouth daily as needed for Muscle spasms. 20 tablet 0   • losartan-hydroCHLOROthiazide 50-12.5 MG Oral Tab Take 1 tablet by mouth daily. 90 tablet 3   • ESOMEPRAZOLE MAGNESIUM 40 MG Oral Capsule Delayed Release Take 1 capsule by mouth every morning before breakfast. 30 capsule 0   • tamsulosin 0.4 MG Oral Cap Take 1 capsule (0.4 mg total) by mouth every evening. 90 capsule 6     Allergies:Allergies[1]    Objective:   Physical Exam  Constitutional:          Assessment & Plan:   No diagnosis found.    No orders of the defined types were placed in this encounter.      Meds This Visit:  Requested Prescriptions      No prescriptions requested or ordered in this encounter       Imaging & Referrals:  None      Location of Pain: right side low back    Date Pain Began: 11/205/24          Work Related:   No        Receiving Work Comp/Disability:   No    Numeric Rating Scale:  Pain at Present:  2                                                                                                            (No Pain) 0  to  10 (Worst Pain)                 Minimum Pain:   1  Maximum Pain  10    Distribution of Pain:    right    Quality of Pain:   sharp/stabbing    Origin of Pain:    Lifting    Aggravating Factors:    Standing and Walking    Past Treatments for Current Pain Condition:   Other none    Prior diagnostic testing for your pain:  none          [1] No Known Allergies

## 2025-01-06 NOTE — PATIENT INSTRUCTIONS
Refill policies:    Allow 2-3 business days for refills; controlled substances may take longer.  Contact your pharmacy at least 5 days prior to running out of medication and have them send an electronic request or submit request through the “request refill” option in your [employer] account.  Refills are not addressed on weekends; covering physicians do not authorize routine medications on weekends.  No narcotics or controlled substances are refilled after noon on Fridays or by on call physicians.  By law, narcotics must be electronically prescribed.  A 30 day supply with no refills is the maximum allowed.  If your prescription is due for a refill, you may be due for a follow up appointment.  To best provide you care, patients receiving routine medications need to be seen at least once a year.  Patients receiving narcotic/controlled substance medications need to be seen at least once every 3 months.  In the event that your preferred pharmacy does not have the requested medication in stock (e.g. Backordered), it is your responsibility to find another pharmacy that has the requested medication available.  We will gladly send a new prescription to that pharmacy at your request.    Scheduling Tests:    If your physician has ordered radiology tests such as MRI or CT scans, please contact Central Scheduling at [phone] right away to schedule the test.  Once scheduled, the [facility] Centralized Referral Team will work with your insurance carrier to obtain pre-certification or prior authorization.  Depending on your insurance carrier, approval may take 3-10 days.  It is highly recommended patients assure they have received an authorization before having a test performed.  If test is done without insurance authorization, patient may be responsible for the entire amount billed.      Precertification and Prior Authorizations:  If your physician has recommended that you have a procedure or additional testing performed the [facility]  Centralized Referral Team will contact your insurance carrier to obtain pre-certification or prior authorization.    You are strongly encouraged to contact your insurance carrier to verify that your procedure/test has been approved and is a COVERED benefit.  Although the [facility] Centralized Referral Team does its due diligence, the insurance carrier gives the disclaimer that \"Although the procedure is authorized, this does not guarantee payment.\"    Ultimately the patient is responsible for payment.   Thank you for your understanding in this matter.  Paperwork Completion:  If you require FMLA or disability paperwork for your recovery, please make sure to either drop it off or have it faxed to our office at [phone]. Be sure the form has your name and date of birth on it.  The form will be faxed to our Forms Department and they will complete it for you.  There is a 25$ fee for all forms that need to be filled out.  Please be aware there is a 10-14 day turnaround time.  You will need to sign a release of information (KAREN) form if your paperwork does not come with one.  You may call the Forms Department with any questions at [phone].  Their fax number is [phone].

## 2025-01-06 NOTE — PROGRESS NOTES
Patient: Mateo Ramirez  Medical Record Number: [id number]  Site: [facility]  Referring Physician:  Dr. Villegas  PCP: same    Dear Dr. Villegas:    Thank you very much for requesting this consultation. I had the opportunity to evaluate and initiate care for your patient today, as per your request.    HISTORY OF CHIEF COMPLAINT:      Mateo Ramirez is a 59 year old male, who complains of R gluteal pain with intermittent radiating pain to R posterior/lateral thigh.    Pt is here today at request of PCP with pain in above described distribution that began atraumatically 1.5 months ago.  He was given short course of flexeril, to limited relief, and celebrex to partial improvement.  He discussed with PCP, and was sent here for further options.      VAS Pain Score:  1-10/10    Aggravating Factors: Relieving Factors:   Lying in one position for prolonged time  Nothing specific      Past Treatment Attempted/Patient’s Response:  As above     Past Medical History:    Esophageal reflux    Heart burn      Past Surgical History:   Procedure Laterality Date    Colonoscopy  02/25/2020    VKA     Knee arthroscopy      Knee replacement surgery        No family history on file.   Social History     Socioeconomic History    Marital status:    Tobacco Use    Smoking status: Never    Smokeless tobacco: Never   Vaping Use    Vaping status: Never Used   Substance and Sexual Activity    Alcohol use: Yes     Alcohol/week: 4.0 standard drinks of alcohol     Types: 2 Glasses of wine, 2 Cans of beer per week     Comment: soc    Drug use: No      Current Medications:  Current Outpatient Medications   Medication Sig Dispense Refill    CELECOXIB 200 MG Oral Cap TAKE ONE CAPSULE BY MOUTH ONE TIME DAILY 90 capsule 0    cyclobenzaprine 10 MG Oral Tab Take 1 tablet (10 mg total) by mouth daily as needed for Muscle spasms. 20 tablet 0    losartan-hydroCHLOROthiazide 50-12.5 MG Oral Tab Take 1 tablet by mouth daily. 90  tablet 3    ESOMEPRAZOLE MAGNESIUM 40 MG Oral Capsule Delayed Release Take 1 capsule by mouth every morning before breakfast. 30 capsule 0    tamsulosin 0.4 MG Oral Cap Take 1 capsule (0.4 mg total) by mouth every evening. 90 capsule 6        Functional Assessment: Patient reports that they are able to complete all of their ADL's such as eating, bathing, using the toilet, dressing and getting up from a bed or a chair independently.    Work History:  The patient currently works full time as .       REVIEW OF SYSTEMS:   10 point review of systems is otherwise negative,unless otherwise in HPI.      Radiology/Lab Test Reviewed:  n/a    CBC:    Lab Results   Component Value Date    WBC 7.2 09/09/2023    WBC 5.9 11/16/2019    WBC 7.6 12/14/2017   No results found for: \"HEMOGLOBIN\"  Lab Results   Component Value Date    .0 09/09/2023    .0 11/16/2019    .0 12/14/2017         PHYSICAL EXAMIMATION   PHYSICAL EXAMINATION: Mateo Ramirez is a 59 year old male who is observed sitting comfortably on a chair in the exam room alert and oriented times three. He looks consistent with his stated age.    Ht Readings from Last 1 Encounters:   12/17/24 71\"     Wt Readings from Last 1 Encounters:   12/17/24 187 lb (84.8 kg)     The patient is well developed, well nourished, normal body habitus, well muscled. He moves independently from sitting to standing with ease.       Inspection:  No acute distress    Patient displays Antalgic gait, and is able to Normal heel walk, Normal toe walk.    Coordination:  Well-coordinated, fluent gait, able to engage in rapid alternating movements of upper and lower extremities.    ROM Lumbar Spine:  See chart below:  Motion Right (+ or -) Left (+ or -)   Lumbar Flexion - -   Lumbar Extension + -   Lumbar Bending + -   Lumbar Extension/ twisting motion - -     Lumbar/Sacral Integument:  Skin over lumbar sacral spine is intact without rashes, excoriations, lesions or  erythema noted    Palpation:  See chart below:  Palpation of lumbar area Right (+ or -) Left (+ or -)   Lumbar facets - -   Lumbar paraspinals - -   Piriformis - -   SIJ - -   Trochanteric Bursa - -     Strength:  Strength deficits noted:  4/5 R EHL, 5/5 otherwise      Sensation:  No sensory deficits noted on bilateral lower extremities to light touch    Tests:  Test Right (+ or -) Left (+ or -)   SLR - -   Sang’s     Babinski     Clonus       HEAD/NECK: Head is normocephalic, neck supple  EYES: EOMI, COURTNEY  SKIN EXAM: Skin is intact, head, neck, trunk and arms/legs. No rashes, mottling or ulcerations.  LYMPH EXAM: There is no lymph edema in either lower extremity.  VASCULAR EXAM: Pedal pulses are normal bilaterally, with good distal perfusion. No clubbing or cyanosis.  ABDOMINAL EXAM: Abdomen is soft without masses palpated.  HEART: normal, regular, S1 and S2  LUNGS:CTA  MUSCULOSKELETAL: Smooth, pain-free ROM to bilat hips, ankles, and knees.     Do you have any known blood/bleeding disorders?  No  Does patient currently take blood thinners?   None  Does patient currently take any antibiotics?   No      Patient is currently on pain medications:  No  Reason pain medications are prescribed: N/A  Pain medications are prescribed by: N/A  [place] Prescription Monitoring review: N/A  DIRE: N/A  Treatment decision: N/A    MEDICAL DECISION MAKING:     Impression: right lumbar radiculitis    Patient has R gluteal pain with intermittent radiating R posterior/lateral thigh pain that has been present for past 1.5 months.  He has found limited relief with flexeril, and is on celebrex for shoulder pain, which seems to help this R gluteal/thigh pain.  On exam, pain reproduced with ROM L spine, no pain with ROM R hip.  He has slight R EHL weakness (4/5), negative SLR.  We discussed options, and will start PT with HEP, and was given course of tapered PO steroid.  Will have him get XR L spine, and if pain persists, f/u to  discuss options.      Plan: PT/HEP, PO steroid, XR.  F/u if pain persists.       The patient indicates understanding of these issues and agrees to the plan.      Thank you very much.     Respectfully yours,    EDDIE Bernal

## 2025-01-07 ENCOUNTER — HOSPITAL ENCOUNTER (OUTPATIENT)
Dept: GENERAL RADIOLOGY | Age: 60
Discharge: HOME | End: 2025-01-07
Attending: PHYSICIAN ASSISTANT
Payer: COMMERCIAL

## 2025-01-07 DIAGNOSIS — M54.16 RIGHT LUMBAR RADICULITIS: ICD-10-CM

## 2025-01-07 PROCEDURE — 72114 X-RAY EXAM L-S SPINE BENDING: CPT | Performed by: PHYSICIAN ASSISTANT

## 2025-01-09 RX ORDER — ESOMEPRAZOLE MAGNESIUM 40 MG/1
40 CAPSULE, DELAYED RELEASE ORAL
Qty: 90 CAPSULE | Refills: 1 | Status: SHIPPED | OUTPATIENT
Start: 2025-01-09

## 2025-01-22 ENCOUNTER — TELEPHONE (OUTPATIENT)
Dept: NEUROLOGY | Facility: CLINIC | Age: 60
End: 2025-01-22

## 2025-01-22 NOTE — TELEPHONE ENCOUNTER
Received medical clearance request from [facility], placed in nurses bin for review.    Patient is currently scheduled for 02/20 with Dr. Casillas.

## 2025-01-22 NOTE — TELEPHONE ENCOUNTER
Fax sent back to [facility] with note stating patient has never seen Dr Casillas and has a new patient appointment on 2/20/25 and Dr Casillas will not consider clearance until after 2/20. Fax did not go through several times today and there was no dial tone when attempted to call [facility] office.   Will attempt to fax tomorrow.

## 2025-01-22 NOTE — TELEPHONE ENCOUNTER
Received Neurology Clearance Request from [facility] for colonoscopy tbd.. Placed in provider folder for review and signature.

## 2025-01-27 ENCOUNTER — TELEPHONE (OUTPATIENT)
Dept: FAMILY MEDICINE CLINIC | Facility: CLINIC | Age: 60
End: 2025-01-27

## 2025-01-27 NOTE — TELEPHONE ENCOUNTER
Patient calling and would like to speak to nursing supervisor regarding charges for Physical/ov     Please advise.

## 2025-01-28 ENCOUNTER — LAB ENCOUNTER (OUTPATIENT)
Dept: LAB | Age: 60
End: 2025-01-28
Attending: STUDENT IN AN ORGANIZED HEALTH CARE EDUCATION/TRAINING PROGRAM
Payer: COMMERCIAL

## 2025-01-28 DIAGNOSIS — R12 HEARTBURN: ICD-10-CM

## 2025-01-28 LAB
ALBUMIN SERPL-MCNC: 4.2 G/DL (ref 3.2–4.8)
ALBUMIN/GLOB SERPL: 1.4 {RATIO} (ref 1–2)
ALP LIVER SERPL-CCNC: 45 U/L
ALT SERPL-CCNC: 15 U/L
ANION GAP SERPL CALC-SCNC: 2 MMOL/L (ref 0–18)
AST SERPL-CCNC: 16 U/L (ref ?–34)
BASOPHILS # BLD AUTO: 0.07 X10(3) UL (ref 0–0.2)
BASOPHILS NFR BLD AUTO: 0.9 %
BILIRUB SERPL-MCNC: 0.6 MG/DL (ref 0.2–1.1)
BUN BLD-MCNC: 22 MG/DL (ref 9–23)
CALCIUM BLD-MCNC: 9.7 MG/DL (ref 8.7–10.6)
CHLORIDE SERPL-SCNC: 105 MMOL/L (ref 98–112)
CO2 SERPL-SCNC: 29 MMOL/L (ref 21–32)
CREAT BLD-MCNC: 1.09 MG/DL
EGFRCR SERPLBLD CKD-EPI 2021: 78 ML/MIN/1.73M2 (ref 60–?)
EOSINOPHIL # BLD AUTO: 0.42 X10(3) UL (ref 0–0.7)
EOSINOPHIL NFR BLD AUTO: 5.2 %
ERYTHROCYTE [DISTWIDTH] IN BLOOD BY AUTOMATED COUNT: 13.8 %
FASTING STATUS PATIENT QL REPORTED: YES
GLOBULIN PLAS-MCNC: 3.1 G/DL (ref 2–3.5)
GLUCOSE BLD-MCNC: 111 MG/DL (ref 70–99)
HCT VFR BLD AUTO: 39.9 %
HGB BLD-MCNC: 13.4 G/DL
IMM GRANULOCYTES # BLD AUTO: 0.01 X10(3) UL (ref 0–1)
IMM GRANULOCYTES NFR BLD: 0.1 %
LYMPHOCYTES # BLD AUTO: 2.38 X10(3) UL (ref 1–4)
LYMPHOCYTES NFR BLD AUTO: 29.6 %
MCH RBC QN AUTO: 29.8 PG (ref 26–34)
MCHC RBC AUTO-ENTMCNC: 33.6 G/DL (ref 31–37)
MCV RBC AUTO: 88.7 FL
MONOCYTES # BLD AUTO: 0.85 X10(3) UL (ref 0.1–1)
MONOCYTES NFR BLD AUTO: 10.6 %
NEUTROPHILS # BLD AUTO: 4.32 X10 (3) UL (ref 1.5–7.7)
NEUTROPHILS # BLD AUTO: 4.32 X10(3) UL (ref 1.5–7.7)
NEUTROPHILS NFR BLD AUTO: 53.6 %
OSMOLALITY SERPL CALC.SUM OF ELEC: 286 MOSM/KG (ref 275–295)
PLATELET # BLD AUTO: 232 10(3)UL (ref 150–450)
POTASSIUM SERPL-SCNC: 4 MMOL/L (ref 3.5–5.1)
PROT SERPL-MCNC: 7.3 G/DL (ref 5.7–8.2)
RBC # BLD AUTO: 4.5 X10(6)UL
SODIUM SERPL-SCNC: 136 MMOL/L (ref 136–145)
WBC # BLD AUTO: 8.1 X10(3) UL (ref 4–11)

## 2025-01-28 PROCEDURE — 80053 COMPREHEN METABOLIC PANEL: CPT

## 2025-01-28 PROCEDURE — 36415 COLL VENOUS BLD VENIPUNCTURE: CPT

## 2025-01-28 PROCEDURE — 85025 COMPLETE CBC W/AUTO DIFF WBC: CPT

## 2025-02-14 ENCOUNTER — TELEPHONE (OUTPATIENT)
Dept: PAIN CLINIC | Facility: CLINIC | Age: 60
End: 2025-02-14

## 2025-02-14 DIAGNOSIS — M54.16 LUMBAR RADICULOPATHY: Primary | ICD-10-CM

## 2025-02-14 RX ORDER — GABAPENTIN 300 MG/1
300 CAPSULE ORAL 3 TIMES DAILY
Qty: 90 CAPSULE | Refills: 0 | Status: SHIPPED | OUTPATIENT
Start: 2025-02-14

## 2025-02-14 NOTE — TELEPHONE ENCOUNTER
Patient called stating that he was in on 1/6. Patient stated that he is having horrible pain that is now going down his leg and he can barely walk. Patient would like to speak with RN about this. Please call patient at: [phone]

## 2025-02-14 NOTE — TELEPHONE ENCOUNTER
Miguel Smith PA  You10 minutes ago (10:52 AM)       Order in for MRI.  He had some weakness on my initial exam, so hopefully the insurance will allow it.     Discussed meds w/ Miguel who states he is willing to provide a prescription for gabapentin 300 mg TID to be titrated slowly if pt would like.    Spoke to pt to advise on need for MRI. Discussed the option for completing through [facility] and provided pt w/ ph # for scheduling. Pt states he would prefer to complete imaging sooner rather than later. Advised pt to schedule a f/u visit w/ Miguel after MRI has been scheduled.     Discussed gabapentin w/ pt and explained that it could cause dizziness or drowsiness that typically resolves once the body acclimates. Pt was initially apprehensive d/t his job. Advised pt that he can take at bed time only to get some relief. After considering, pt states he would like to take TID. Advised pt it will need to be titrated and that instructions will be on script. Pt VU of all discussed.       Discontinued order for MRI w/ [facility] and placed a new order for [facility].

## 2025-02-14 NOTE — TELEPHONE ENCOUNTER
Patient calling with pain to:  Right side leg pain, hip, buttock. Pt states he feels this is different than when he was seen. Pt states there is numbness in his leg at times and he has to change positions/bend over while standing to alleviate pain.   Rating pain: 8-9/10  States pain is: Numbness is new. Symptoms are now going all the way to the calf, which is new.   Currently taking: Celebrex and cyclobenzaprine w/ little relief. 2 Advil this morning. Recommended pt to take Tylenol instead of Advil since he is already taking an NSAID.    Pt states he did take the MDP and did not notice any difference in his symptoms.     Pt states he has been going to PT for 3 weeks. Pt states pain was ok during 3 weeks of PT, but this past week pain has changed and increased. Pt states he felt a lot of improvement after the first week, but after this week, symptoms changed.    Pt denies and B/B issues, saddle anesthesia, or loss of control over LE.    Last seen in office on 1/6/25 with Miguel.     Last OV Notes:    Upcoming procedures scheduled for:  MEDICAL DECISION MAKING:      Impression: right lumbar radiculitis     Patient has R gluteal pain with intermittent radiating R posterior/lateral thigh pain that has been present for past 1.5 months.  He has found limited relief with flexeril, and is on celebrex for shoulder pain, which seems to help this R gluteal/thigh pain.  On exam, pain reproduced with ROM L spine, no pain with ROM R hip.  He has slight R EHL weakness (4/5), negative SLR.  We discussed options, and will start PT with HEP, and was given course of tapered PO steroid.  Will have him get XR L spine, and if pain persists, f/u to discuss options.       Plan: PT/HEP, PO steroid, XR.  F/u if pain persists.        The patient indicates understanding of these issues and agrees to the plan.        Thank you very much.      Respectfully yours,     EDDIE Bernal

## 2025-02-15 ENCOUNTER — HOSPITAL ENCOUNTER (EMERGENCY)
Age: 60
Discharge: HOME | End: 2025-02-15
Attending: EMERGENCY MEDICINE
Payer: COMMERCIAL

## 2025-02-15 VITALS
HEIGHT: 71 IN | HEART RATE: 80 BPM | BODY MASS INDEX: 25.2 KG/M2 | RESPIRATION RATE: 18 BRPM | WEIGHT: 180 LBS | OXYGEN SATURATION: 99 % | SYSTOLIC BLOOD PRESSURE: 136 MMHG | DIASTOLIC BLOOD PRESSURE: 91 MMHG | TEMPERATURE: 98 F

## 2025-02-15 DIAGNOSIS — M54.31 SCIATICA OF RIGHT SIDE: Primary | ICD-10-CM

## 2025-02-15 PROCEDURE — 96372 THER/PROPH/DIAG INJ SC/IM: CPT

## 2025-02-15 PROCEDURE — 99283 EMERGENCY DEPT VISIT LOW MDM: CPT

## 2025-02-15 RX ORDER — DEXAMETHASONE 4 MG/1
10 TABLET ORAL ONCE
Status: COMPLETED | OUTPATIENT
Start: 2025-02-15 | End: 2025-02-15

## 2025-02-15 RX ORDER — DIAZEPAM 5 MG/1
5 TABLET ORAL 3 TIMES DAILY PRN
Qty: 8 TABLET | Refills: 0 | Status: SHIPPED | OUTPATIENT
Start: 2025-02-15 | End: 2025-02-23

## 2025-02-15 RX ORDER — KETOROLAC TROMETHAMINE 15 MG/ML
15 INJECTION, SOLUTION INTRAMUSCULAR; INTRAVENOUS ONCE
Status: COMPLETED | OUTPATIENT
Start: 2025-02-15 | End: 2025-02-15

## 2025-02-15 NOTE — ED INITIAL ASSESSMENT (HPI)
Pt c/o pain in lower right back that radiates down right leg, increased pain throughout the week, Pt states pain initially started in Dec., and has been going to PT, however, due to pain he has not been able to go

## 2025-02-16 NOTE — ED PROVIDER NOTES
Patient Seen in: [facility] [place]      History     Chief Complaint   Patient presents with    Back Pain     Stated Complaint: sciatica raidiates down right leg    Subjective:   HPI  Patient is a 60 yo M who presents to ED for evaluation of R sided buttocks pain radiating down RLE. Patient reports he has had this pain for a couple of months. Pain is intermittent, worse with walking or bearing weight on RLE. Pt states pain is localized to R buttocks region and shoots down RLE. He is currently in PT for this but states he does not feel this has been helping. He also completed medrol dose pack and took flexeril with no significant improvement. Pt states sometimes the pain makes it difficult to walk but he is able to ambulate. He occasionally has intermittent numbness/tingling in RLE when pain is severe but this is not constant. Currently no numbness or weakness. Denies any bowel or bladder changes, saddle anesthesia, fevers, hx of cancer. No fevers, abd pain or other associated symptoms. 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  Pt is following with pain clinic and has outpatient MRI lumbar spine this Wednesday. He was started on gabapentin nightly yesterday but has only had one dose of this so far.     XR on 1/7/2025 reviewed which shows mild L4-L5, L5-S1 disc space narrowing.     Objective:     Past Medical History:    Esophageal reflux    Frequent urination    Heart burn    Heartburn              Past Surgical History:   Procedure Laterality Date    Colonoscopy  02/25/2020    VKA     Knee arthroscopy      Knee replacement surgery                  Social History     Socioeconomic History    Marital status:    Tobacco Use    Smoking status: Never    Smokeless tobacco: Never   Vaping Use    Vaping status: Never Used   Substance and Sexual Activity    Alcohol use: Yes     Alcohol/week: 10.0 standard drinks of alcohol     Types: 4 Glasses of wine, 6  Cans of beer per week     Comment: On occasion, I have a glass of wine or a couple beers    Drug use: No                  Physical Exam     ED Triage Vitals [02/15/25 1704]   /88   Pulse 98   Resp 18   Temp 97.6 °F (36.4 °C)   Temp src Oral   SpO2 98 %   O2 Device None (Room air)       Current Vitals:   Vital Signs  BP: (!) 136/91  Pulse: 80  Resp: 18  Temp: 97.6 °F (36.4 °C)  Temp src: Oral    Oxygen Therapy  SpO2: 99 %  O2 Device: None (Room air)        Physical Exam  Vitals and nursing note reviewed.   Constitutional:       General: He is not in acute distress.  HENT:      Head: Normocephalic and atraumatic.      Nose: Nose normal.      Mouth/Throat:      Mouth: Mucous membranes are moist.   Eyes:      Extraocular Movements: Extraocular movements intact.      Pupils: Pupils are equal, round, and reactive to light.   Cardiovascular:      Rate and Rhythm: Normal rate and regular rhythm.      Pulses: Normal pulses.   Pulmonary:      Effort: Pulmonary effort is normal. No respiratory distress.      Breath sounds: Normal breath sounds. No wheezing.   Abdominal:      General: There is no distension.      Palpations: Abdomen is soft.   Musculoskeletal:         General: No swelling. Normal range of motion.      Cervical back: Normal range of motion.      Comments: R buttocks TTP  5/5 strength in UE/LE bilaterally  Normal sensation in all extremities  2+ distal pulses  Able to ambulate   Skin:     General: Skin is warm and dry.      Capillary Refill: Capillary refill takes less than 2 seconds.   Neurological:      General: No focal deficit present.      Mental Status: He is alert.   Psychiatric:         Mood and Affect: Mood normal.           ED Course   Labs Reviewed - No data to display           MDM      Patient is a 58 yo M who presents to ED for evaluation of R sided buttocks pain radiating down RLE.  VSS. Patient is well appearing on exam. Patient has reproducible R buttocks TTP. Normal strength/sensation in  all extremities. 2+ distal uplses.     Patient presents with back pain most c/w sciatica or piriformis syndrome. No red back pain red flags on history or physical Presentation not c/w malignancy (no hx of malignancy, lack of other associated symptoms), fracture (no trauma, no midline TTP of spine), cauda equina (no bowel or urinary incontinence/retention, no saddle anesthesia or weakness), AAA, renal colic, pyleo (afebrile, no CVT or urinary symptoms). Given clinical picture no indication for imaging at this time.     Plan for symptomatic treatment with IM toradol, decadron, lido patch.     ED Course as of 02/16/25 0201  ------------------------------------------------------------  Time: 02/15 2035  Comment: Patient reports improvement with pain.  He is able to ambulate but still has some occasional shooting pain down his right lower extremity.  At this time still no emergent indication for MRI.  Patient's outpatient MRI scheduled on Wednesday.  He has no neurologic deficits.  No numbness, tingling or weakness at this time.  No bowel or bladder changes.      I discussed strict return precautions.  Prescribed course of Valium but recommended continuing gabapentin as this might take time to work over the next few days. Discussed this can be increased with his doctor's guidance.      Patient is stable for discharge home with close PCP f/u. Discussed strict return precautions and supportive care. Patient verbalized understanding and agreement of plan.              Medical Decision Making  Amount and/or Complexity of Data Reviewed  External Data Reviewed: notes.    Risk  Prescription drug management.        Disposition and Plan     Clinical Impression:  1. Sciatica of right side         Disposition:  Discharge  2/15/2025  8:20 pm    Follow-up:  No follow-up provider specified.        Medications Prescribed:  Discharge Medication List as of 2/15/2025  8:23 PM        START taking these medications    Details   diazePAM 5  MG Oral Tab Take 1 tablet (5 mg total) by mouth 3 (three) times daily as needed (Pain)., Normal, Disp-8 tablet, R-0                 Supplementary Documentation:

## 2025-02-16 NOTE — DISCHARGE INSTRUCTIONS
You were seen in the emergency department.    -Continue taking gabapentin as prescribed.  -Take Valium as needed for severe breakthrough pain.  This medication can make you drowsy so do not drive while taking this.  Do not take Flexeril with this.  -Continue your Celebrex.  She also take Tylenol every 8 hours as needed for pain.  Apply lidocaine patches heat or ice to the effected area.    Please go to your MRI appointment next week as scheduled.  Return to the ER if you develop fevers, leg weakness or persistent numbness, urinary retention or incontinence, stool incontinence, numbness in your groin area, or any other new concerns or worsening symptoms.  Please follow closely with your primary care physician and the pain clinic.

## 2025-02-17 NOTE — TELEPHONE ENCOUNTER
Contacted pt at this time. Advised that our providers do not have the privilege to order the MRI STAT as our procedures are all elective. Pt vu and no further questions at this time.

## 2025-02-17 NOTE — TELEPHONE ENCOUNTER
Patient requesting STAT MRI. Patient is scheduled at [facility] on  2/19.   Informed patient that we do not do STAT Imaging.  Patient asked for message to be sent to Miguel anyway.

## 2025-02-20 ENCOUNTER — OFFICE VISIT (OUTPATIENT)
Dept: NEUROLOGY | Facility: CLINIC | Age: 60
End: 2025-02-20
Payer: COMMERCIAL

## 2025-02-20 ENCOUNTER — TELEPHONE (OUTPATIENT)
Dept: PAIN CLINIC | Facility: CLINIC | Age: 60
End: 2025-02-20

## 2025-02-20 VITALS — DIASTOLIC BLOOD PRESSURE: 74 MMHG | SYSTOLIC BLOOD PRESSURE: 138 MMHG | HEART RATE: 80 BPM | RESPIRATION RATE: 16 BRPM

## 2025-02-20 DIAGNOSIS — G45.9 TIA (TRANSIENT ISCHEMIC ATTACK): Primary | ICD-10-CM

## 2025-02-20 DIAGNOSIS — Q28.3 DEVELOPMENTAL VENOUS ANOMALY (HCC): ICD-10-CM

## 2025-02-20 DIAGNOSIS — R29.898 LEFT ARM WEAKNESS: ICD-10-CM

## 2025-02-20 PROCEDURE — 99204 OFFICE O/P NEW MOD 45 MIN: CPT | Performed by: OTHER

## 2025-02-20 PROCEDURE — 3078F DIAST BP <80 MM HG: CPT | Performed by: OTHER

## 2025-02-20 PROCEDURE — 3075F SYST BP GE 130 - 139MM HG: CPT | Performed by: OTHER

## 2025-02-20 NOTE — TELEPHONE ENCOUNTER
MRI reads as fairly benign, and while I do not have images, states that:    Mild lumbar spondylosis is most pronounced at the L4-5 and L5-S1 where there is slight anterolisthesis with minor disc bulges and bony degenerative changes.  The findings cause minimal to mild stenosis.  No focal herniation is identified.  Please see above for complete level by level details.    Sent to scan.

## 2025-02-20 NOTE — TELEPHONE ENCOUNTER
Received patient's MRI Lumbar Spine report from [facility]. Report placed in RN bin for provider review.

## 2025-02-20 NOTE — PROGRESS NOTES
Patient here for evaluation of left arm weakness that occurred for a couple of days in December. Was in [place] for a wedding and acute onset of left arm weakness occurred. Symptoms have completely resolved. PCP ordered MRI.

## 2025-02-20 NOTE — PROGRESS NOTES
[facility]    Date of consult: 2/20/2025    Assessment:    ICD-10-CM    1. TIA (transient ischemic attack)  G45.9       2. Left arm weakness  R29.898 EEG     CARD ECHO 2D W DOPPLER/BUBBLES (CPT=93306)     US CAROTID DOPPLER BILAT - DIAG IMG (CPT=93880)      3. Developmental venous anomaly (HCC)  Q28.3 EEG            Plan:  ASA 81 mg  Recommended statin, advised pt to check lipid panel as ordered   Stroke education given  Stroke risk factors management by primary   Carotid doppler  Echo   Ok to endoscopy from neuro standpoint  See orders and medications filed with this encounter. The patient indicates understanding of these issues and agrees with the plan.  Discussed with patient regarding assessment, work up, care plan   RTC 3 months  Pt should go ER for any new or worsening symptoms and contact office     Subjective:   CC/Reason for consult: resolved left arm weakness in Dec   Consult Requested by  Nima Villegas MD    HPI: Mateo Ramirez is a 59 year old male with past medical history as listed below presents here for initial evaluation of left arm weakness that occurred for a couple of days in December. Was in [place] for a wedding and acute onset of left arm weakness occurred. Symptoms have completely resolved. He states it lasted >1 day, he did not go ER; PCP ordered MRI, here to establish and review care plan, has right sciatica now and is following with pain management; No new focal weakness, numbness, gait imbalance, vision or speech difficulties. Admits h/o traumatic brain injury. He is also following with GI re; heart burn.  History/Other:   REVIEW OF SYSTEMS:  A comprehensive 14-point system was reviewed. Pertinent positives and negatives are noted in HPI.       Current Outpatient Medications:     gabapentin 300 MG Oral Cap, Take 1 capsule (300 mg total) by mouth 3 (three) times daily. Titration Instructions: Take 1 capsule (300 mg) at night for 3 days. Days 4-6, take 1 capsule (300  mg) midday and 1 capsule (300 mg) at night. Day 7, begin taking three times daily (1 cap in the AM, 1 cap midday, 1 cap PM)., Disp: 90 capsule, Rfl: 0    Esomeprazole Magnesium 40 MG Oral Capsule Delayed Release, Take 1 capsule by mouth every morning before breakfast., Disp: 90 capsule, Rfl: 1    CELECOXIB 200 MG Oral Cap, TAKE ONE CAPSULE BY MOUTH ONE TIME DAILY, Disp: 90 capsule, Rfl: 0    losartan-hydroCHLOROthiazide 50-12.5 MG Oral Tab, Take 1 tablet by mouth daily., Disp: 90 tablet, Rfl: 3    tamsulosin 0.4 MG Oral Cap, Take 1 capsule (0.4 mg total) by mouth every evening., Disp: 90 capsule, Rfl: 6    diazePAM 5 MG Oral Tab, Take 1 tablet (5 mg total) by mouth 3 (three) times daily as needed (Pain). (Patient not taking: Reported on 2/20/2025), Disp: 8 tablet, Rfl: 0  Allergies:  Allergies[1]  Past Medical History:    Esophageal reflux    Frequent urination    Heart burn    Heartburn     Past Surgical History:   Procedure Laterality Date    Colonoscopy  02/25/2020    VKA     Knee arthroscopy      Knee replacement surgery Left     2017     Social History:  Social History     Tobacco Use    Smoking status: Never    Smokeless tobacco: Never   Substance Use Topics    Alcohol use: Yes     Alcohol/week: 10.0 standard drinks of alcohol     Types: 4 Glasses of wine, 6 Cans of beer per week     Comment: On occasion, I have a glass of wine or a couple beers     Family history:  Patient denies any pertinent family history associated with the current problem    Objective:   Physical Examination:  /74   Pulse 80   Resp 16   General: Awake and alert; in no acute distress  HEENT: Eye sclerae are anicteric; scalp is atraumatic  Neck: Supple  Cardiac: Regular rate and regular rhythm  Lungs: Clear   Abdomen:  non-tender  Extremities: No clubbing or cyanosis; moves extremities   Psychiatric: Normal mood and affect; answers questions appropriately  Dermatologic: No rashes; no edema  Neurological Examination:  Language:  normal   Speech: no dysarthria  CN: II-XII intact  Motor strength: 5/5 all extremities  Tone: normal  DTRs: 2+ symmetric  Coordination: Normal FTN  Sensory: symmetric   Gait: nl    Data Reviewed on 2/20/2025  Notes Reviewed on 2/20/2025  Labs Reviewed  on 2/20/2025    Randi \"Marco Antonio\" MD Vinny   Neurology  [facility]  2/20/2025, 1:25 PM  Consultation Report: being sent/fax/route to requesting provider   CC: Nima Villegas MD         [1] No Known Allergies

## 2025-02-20 NOTE — PATIENT INSTRUCTIONS
Refill policies:    Allow 2-3 business days for refills; controlled substances may take longer.  Contact your pharmacy at least 5 days prior to running out of medication and have them send an electronic request or submit request through the “request refill” option in your [employer] account.  Refills are not addressed on weekends; covering physicians do not authorize routine medications on weekends.  No narcotics or controlled substances are refilled after noon on Fridays or by on call physicians.  By law, narcotics must be electronically prescribed.  A 30 day supply with no refills is the maximum allowed.  If your prescription is due for a refill, you may be due for a follow up appointment.  To best provide you care, patients receiving routine medications need to be seen at least once a year.  Patients receiving narcotic/controlled substance medications need to be seen at least once every 3 months.  In the event that your preferred pharmacy does not have the requested medication in stock (e.g. Backordered), it is your responsibility to find another pharmacy that has the requested medication available.  We will gladly send a new prescription to that pharmacy at your request.    Scheduling Tests:    If your physician has ordered radiology tests such as MRI or CT scans, please contact Central Scheduling at [phone] right away to schedule the test.  Once scheduled, the [facility] Centralized Referral Team will work with your insurance carrier to obtain pre-certification or prior authorization.  Depending on your insurance carrier, approval may take 3-10 days.  It is highly recommended patients assure they have received an authorization before having a test performed.  If test is done without insurance authorization, patient may be responsible for the entire amount billed.      Precertification and Prior Authorizations:  If your physician has recommended that you have a procedure or additional testing performed the [facility]  Centralized Referral Team will contact your insurance carrier to obtain pre-certification or prior authorization.    You are strongly encouraged to contact your insurance carrier to verify that your procedure/test has been approved and is a COVERED benefit.  Although the [facility] Centralized Referral Team does its due diligence, the insurance carrier gives the disclaimer that \"Although the procedure is authorized, this does not guarantee payment.\"    Ultimately the patient is responsible for payment.   Thank you for your understanding in this matter.  Paperwork Completion:  If you require FMLA or disability paperwork for your recovery, please make sure to either drop it off or have it faxed to our office at [phone]. Be sure the form has your name and date of birth on it.  The form will be faxed to our Forms Department and they will complete it for you.  There is a 25$ fee for all forms that need to be filled out.  Please be aware there is a 10-14 day turnaround time.  You will need to sign a release of information (KAREN) form if your paperwork does not come with one.  You may call the Forms Department with any questions at [phone].  Their fax number is [phone].

## 2025-02-21 ENCOUNTER — TELEPHONE (OUTPATIENT)
Dept: FAMILY MEDICINE CLINIC | Facility: CLINIC | Age: 60
End: 2025-02-21

## 2025-02-21 NOTE — TELEPHONE ENCOUNTER
Patient called asking if he can take his medication prior to labs, advised ok to take medicine and drink water.

## 2025-02-22 ENCOUNTER — LABORATORY ENCOUNTER (OUTPATIENT)
Dept: LAB | Age: 60
End: 2025-02-22
Attending: FAMILY MEDICINE
Payer: COMMERCIAL

## 2025-02-22 DIAGNOSIS — Z00.00 ROUTINE GENERAL MEDICAL EXAMINATION AT HEALTH CARE FACILITY: ICD-10-CM

## 2025-02-22 DIAGNOSIS — Z12.5 SCREENING FOR PROSTATE CANCER: ICD-10-CM

## 2025-02-22 LAB
ALBUMIN SERPL-MCNC: 4.8 G/DL (ref 3.2–4.8)
ALBUMIN/GLOB SERPL: 1.7 {RATIO} (ref 1–2)
ALP LIVER SERPL-CCNC: 55 U/L
ALT SERPL-CCNC: 19 U/L
ANION GAP SERPL CALC-SCNC: 6 MMOL/L (ref 0–18)
AST SERPL-CCNC: 22 U/L (ref ?–34)
BASOPHILS # BLD AUTO: 0.1 X10(3) UL (ref 0–0.2)
BASOPHILS NFR BLD AUTO: 1.1 %
BILIRUB SERPL-MCNC: 0.9 MG/DL (ref 0.2–1.1)
BUN BLD-MCNC: 14 MG/DL (ref 9–23)
CALCIUM BLD-MCNC: 9.9 MG/DL (ref 8.7–10.6)
CHLORIDE SERPL-SCNC: 102 MMOL/L (ref 98–112)
CHOLEST SERPL-MCNC: 204 MG/DL (ref ?–200)
CO2 SERPL-SCNC: 28 MMOL/L (ref 21–32)
COMPLEXED PSA SERPL-MCNC: 2.37 NG/ML (ref ?–4)
CREAT BLD-MCNC: 1.02 MG/DL
EGFRCR SERPLBLD CKD-EPI 2021: 84 ML/MIN/1.73M2 (ref 60–?)
EOSINOPHIL # BLD AUTO: 0.57 X10(3) UL (ref 0–0.7)
EOSINOPHIL NFR BLD AUTO: 6.3 %
ERYTHROCYTE [DISTWIDTH] IN BLOOD BY AUTOMATED COUNT: 14.2 %
FASTING PATIENT LIPID ANSWER: YES
FASTING STATUS PATIENT QL REPORTED: YES
GLOBULIN PLAS-MCNC: 2.8 G/DL (ref 2–3.5)
GLUCOSE BLD-MCNC: 97 MG/DL (ref 70–99)
HCT VFR BLD AUTO: 47.5 %
HDLC SERPL-MCNC: 45 MG/DL (ref 40–59)
HGB BLD-MCNC: 15.8 G/DL
IMM GRANULOCYTES # BLD AUTO: 0.08 X10(3) UL (ref 0–1)
IMM GRANULOCYTES NFR BLD: 0.9 %
LDLC SERPL CALC-MCNC: 136 MG/DL (ref ?–100)
LYMPHOCYTES # BLD AUTO: 2.27 X10(3) UL (ref 1–4)
LYMPHOCYTES NFR BLD AUTO: 25.1 %
MCH RBC QN AUTO: 29.4 PG (ref 26–34)
MCHC RBC AUTO-ENTMCNC: 33.3 G/DL (ref 31–37)
MCV RBC AUTO: 88.3 FL
MONOCYTES # BLD AUTO: 0.8 X10(3) UL (ref 0.1–1)
MONOCYTES NFR BLD AUTO: 8.8 %
NEUTROPHILS # BLD AUTO: 5.23 X10 (3) UL (ref 1.5–7.7)
NEUTROPHILS # BLD AUTO: 5.23 X10(3) UL (ref 1.5–7.7)
NEUTROPHILS NFR BLD AUTO: 57.8 %
NONHDLC SERPL-MCNC: 159 MG/DL (ref ?–130)
OSMOLALITY SERPL CALC.SUM OF ELEC: 282 MOSM/KG (ref 275–295)
PLATELET # BLD AUTO: 260 10(3)UL (ref 150–450)
POTASSIUM SERPL-SCNC: 4.2 MMOL/L (ref 3.5–5.1)
PROT SERPL-MCNC: 7.6 G/DL (ref 5.7–8.2)
RBC # BLD AUTO: 5.38 X10(6)UL
SODIUM SERPL-SCNC: 136 MMOL/L (ref 136–145)
T4 FREE SERPL-MCNC: 1.3 NG/DL (ref 0.8–1.7)
TRIGL SERPL-MCNC: 130 MG/DL (ref 30–149)
TSI SER-ACNC: 1.68 UIU/ML (ref 0.55–4.78)
VLDLC SERPL CALC-MCNC: 24 MG/DL (ref 0–30)
WBC # BLD AUTO: 9.1 X10(3) UL (ref 4–11)

## 2025-02-22 PROCEDURE — 80050 GENERAL HEALTH PANEL: CPT | Performed by: FAMILY MEDICINE

## 2025-02-22 PROCEDURE — 80061 LIPID PANEL: CPT | Performed by: FAMILY MEDICINE

## 2025-02-22 PROCEDURE — 84439 ASSAY OF FREE THYROXINE: CPT | Performed by: FAMILY MEDICINE

## 2025-02-22 PROCEDURE — 84153 ASSAY OF PSA TOTAL: CPT | Performed by: FAMILY MEDICINE

## 2025-03-03 ENCOUNTER — VIRTUAL PHONE E/M (OUTPATIENT)
Dept: PAIN CLINIC | Facility: CLINIC | Age: 60
End: 2025-03-03
Payer: COMMERCIAL

## 2025-03-03 DIAGNOSIS — M43.16 SPONDYLOLISTHESIS OF LUMBAR REGION: Primary | ICD-10-CM

## 2025-03-03 DIAGNOSIS — M54.16 LUMBAR RADICULITIS: ICD-10-CM

## 2025-03-03 RX ORDER — GABAPENTIN 300 MG/1
300 CAPSULE ORAL 3 TIMES DAILY
Qty: 90 CAPSULE | Refills: 0 | Status: SHIPPED | OUTPATIENT
Start: 2025-03-03

## 2025-03-03 NOTE — PROGRESS NOTES
Mateo Ramirez verbally consents to a Video Visit Check-In service on 03/03/25.    Duration of Service:  20 minutes    HPI:   Mateo Ramirez presents with complaints of Low back pain radiating to R LE .    The pain is described as moderate aching, shooting that is intermittent.  The patient’s activity level has remained the same since last visit.  The pain is worst unrelated to time of day.    Changes in condition/history since last visit: pt is here today for f/u, having been seen once previously on 1/6/25.  He had been sent to PT, to partial initial relief, though had flare on 2/15/25 and was see in ER.  Given neurontin, which is helping, and was sent for MRI.  Here for review.      Last procedure: n/a    Date: n/a    Percentage of relief experienced from the procedure: n/a%    Duration of the relief: n/a    The following activities will increase the patient’s pain: walking, standing    The following activities decrease the patient’s pain: limiting activity level    Functional Assessment: Patient reports that they are able to complete all of their ADL's such as eating, bathing, using the toilet, dressing and getting up from a bed or a chair independently.    Current Medications:  Current Outpatient Medications   Medication Sig Dispense Refill    gabapentin 300 MG Oral Cap Take 1 capsule (300 mg total) by mouth 3 (three) times daily. Titration Instructions: Take 1 capsule (300 mg) at night for 3 days. Days 4-6, take 1 capsule (300 mg) midday and 1 capsule (300 mg) at night. Day 7, begin taking three times daily (1 cap in the AM, 1 cap midday, 1 cap PM). 90 capsule 0    Esomeprazole Magnesium 40 MG Oral Capsule Delayed Release Take 1 capsule by mouth every morning before breakfast. 90 capsule 1    CELECOXIB 200 MG Oral Cap TAKE ONE CAPSULE BY MOUTH ONE TIME DAILY 90 capsule 0    losartan-hydroCHLOROthiazide 50-12.5 MG Oral Tab Take 1 tablet by mouth daily. 90 tablet 3    tamsulosin 0.4 MG Oral Cap Take 1 capsule  (0.4 mg total) by mouth every evening. 90 capsule 6      Patient requires assistance with: No assistance required    Reviewed Patient History Dated: 1/6/25 no changes noted    Physical Exam:   There were no vitals taken for this visit.  VAS Pain Score:  /10  General Appearance: Well developed, well nourished, normal build, independent body habitus, no apparent physical disabilities, well groomed    Neurological Exam: WNL-Orientation to time, place and person, normal mood & effect, normal concentration & attention span  Inspection: tele visit  Radiology/Lab Test Reviewed: MRI L spine:    Lab Results   Component Value Date    WBC 9.1 02/22/2025    WBC 8.1 01/28/2025    WBC 7.2 09/09/2023   No results found for: \"HEMOGLOBIN\"  Lab Results   Component Value Date    .0 02/22/2025    .0 01/28/2025    .0 09/09/2023     Do you have any known blood/bleeding disorders?  No  Does patient currently take blood thinners?   None  Does patient currently take any antibiotics?   No  Patient educated and verbalized understanding.  Medical Decision Making:   Diagnosis:    Encounter Diagnoses   Name Primary?    Spondylolisthesis of lumbar region Yes    Lumbar radiculitis        Impression: no relief with PT, though has found neurontin to be effective.  MRI is relatively benign, with mild spondylolisthesis without significant stenosis per report, though do not have images for review personally.  Asked that he drop off images, and after review, would be willing to consider possible LESI.      Plan: Refill medications without change.  Will drop off images, and after review, can consider LESI.      No orders of the defined types were placed in this encounter.      Meds & Refills for this Visit:  Requested Prescriptions      No prescriptions requested or ordered in this encounter       Imaging & Consults:  None    The patient indicates understanding of these issues and agrees to the plan.    EDDIE Bernal

## 2025-03-04 ENCOUNTER — TELEPHONE (OUTPATIENT)
Dept: NEUROLOGY | Facility: CLINIC | Age: 60
End: 2025-03-04

## 2025-03-04 ENCOUNTER — HOSPITAL ENCOUNTER (OUTPATIENT)
Dept: ULTRASOUND IMAGING | Age: 60
Discharge: HOME | End: 2025-03-04
Attending: Other
Payer: COMMERCIAL

## 2025-03-04 DIAGNOSIS — R29.898 LEFT ARM WEAKNESS: ICD-10-CM

## 2025-03-04 PROCEDURE — 93880 EXTRACRANIAL BILAT STUDY: CPT | Performed by: OTHER

## 2025-03-04 NOTE — TELEPHONE ENCOUNTER
Uploaded 2/19/25 MRI Lumbar SP W/O. Verified upload via universal manager. Per Pt's request, placed in drawer for p/u at next appt. Endorsed to Provider.

## 2025-03-05 ENCOUNTER — HOSPITAL ENCOUNTER (OUTPATIENT)
Dept: CV DIAGNOSTICS | Facility: HOSPITAL | Age: 60
Discharge: HOME | End: 2025-03-05
Attending: Other
Payer: COMMERCIAL

## 2025-03-05 DIAGNOSIS — R29.898 LEFT ARM WEAKNESS: ICD-10-CM

## 2025-03-05 PROCEDURE — 93306 TTE W/DOPPLER COMPLETE: CPT | Performed by: OTHER

## 2025-03-25 ENCOUNTER — NURSE ONLY (OUTPATIENT)
Dept: ELECTROPHYSIOLOGY | Facility: HOSPITAL | Age: 60
End: 2025-03-25
Attending: Other
Payer: COMMERCIAL

## 2025-03-25 DIAGNOSIS — R29.898 LEFT ARM WEAKNESS: ICD-10-CM

## 2025-03-25 DIAGNOSIS — Q28.3 DEVELOPMENTAL VENOUS ANOMALY (HCC): ICD-10-CM

## 2025-03-25 PROCEDURE — 95816 EEG AWAKE AND DROWSY: CPT | Performed by: OTHER

## 2025-03-25 NOTE — PROCEDURES
Date of Procedure: 3/25/2025    Procedure: EEG (ELECTROENCEPHALOGRAM)     HISTORY: A 59 YEAR OLD MALE PRESENTS FOR EVALUATION AFTER AN EPISODE OF LEFT ARM PARALYSIS IN DEC 2024. PATIENT REPORTS SUDDENLY NOT BEING ABLE TO USE HIS LEFT ARM FOR 2 DAYS.  HE HAD PAIN IN THE ARM AND THE NEXT DAY HE WAS UNABLE TO LIFT IT. NO ABNORMAL/INVOLUNTARY MOVEMENTS OR SPASMS. AFTER 2 DAYS, SYMPTOMS RESOLVED ON THEIR OWN. NO FURTHER EVENTS.  PAST MEDICAL HISTORY: HEARTBURN  MEDICATIONS: GABAPENTIN, ESOMEPRAZOLE MAGNESIUM, CELECOXIB, LOSARTAN-HYDROCHLOROTHIAZIDE, TAMULOSIN  PREVIOUS EEG: NONE  PATIENT STATE: ALERT AND ORIENTED X 4  PATIENT STATE DURING EEG: AWAKE, DROWSY  HYPERVENTILATION PERFORMED: PERFORMED  PHOTIC STIMULATION PERFORMED: PERFORMED    BACKGROUND ACTIVITY: Posterior rhythm was in the range of 8-10 Hz, reactive to eye opening; symmetrical and synchronous. Drowsiness is characterized by intermittent theta waves bitemporally.   EPILEPTIFORM DISCHARGES: There were no epileptiform discharges recorded throughout the recording.   HYPERVENTILATION: Hyperventilation was performed with no change.  PHOTIC STIMULATION: Photic stimulation was performed with no change.   Stage II sleep was not reached.    IMPRESSION:  This is a normal awake and drowsy EEG. However, this does not rule out seizure disorder. Clinical correlation is advised.    Randi Casillas MD   Neurology  [facility]  3/25/2025, 2:19 PM  CC: Nima Villegas MD

## 2025-03-27 RX ORDER — CELECOXIB 200 MG/1
200 CAPSULE ORAL DAILY
Qty: 90 CAPSULE | Refills: 0 | Status: SHIPPED | OUTPATIENT
Start: 2025-03-27

## 2025-04-08 ENCOUNTER — PATIENT MESSAGE (OUTPATIENT)
Dept: PAIN CLINIC | Facility: CLINIC | Age: 60
End: 2025-04-08

## 2025-04-08 DIAGNOSIS — M54.16 LUMBAR RADICULITIS: ICD-10-CM

## 2025-04-08 DIAGNOSIS — M43.16 SPONDYLOLISTHESIS OF LUMBAR REGION: Primary | ICD-10-CM

## 2025-04-08 NOTE — TELEPHONE ENCOUNTER
Miguel Smith PA  [facility] Pain Nurse14 minutes ago (3:47 PM)       Reviewed images.  While there are no areas of severe stenosis, there is some mild narrowing at L4/5.  Happy to place order for LESI.

## 2025-04-11 ENCOUNTER — TELEPHONE (OUTPATIENT)
Dept: PAIN CLINIC | Facility: CLINIC | Age: 60
End: 2025-04-11

## 2025-04-11 DIAGNOSIS — M54.16 LUMBAR RADICULITIS: Primary | ICD-10-CM

## 2025-04-11 NOTE — TELEPHONE ENCOUNTER
Prior authorization request completed for: YANG  Authorization # no auth needed   Pre-D:  no  Exclusions/Restrictions: no  Covered Benefit: yes  Authorization dates: n/a  CPT codes approved: 06004  Number of visits/dates of service approved: 1  Physician: sarahi  Location: [facility]   Call Ref#: alexis zeke 4/11/25 9:36am  Representative Name: alexis  Insurance Carrier: [employer] [phone]    Patient can be scheduled. Routed to Navigator.

## 2025-04-11 NOTE — TELEPHONE ENCOUNTER
Patient advised of insurance approval to proceed with injections and is agreeable to scheduling. pre-procedure instructions reviewed. Patient prefers Local sedation. Reviewed sedation instructions including No Fasting & No  Required. Patient encouraged but not required to hold Celecoxib for 24 hours prior to procedure. Patient verbalized understanding of instructions, no further needs at this time.       [facility]  PRE-PROCEDURE INSTRUCTIONS WITHOUT SEDATION    Procedure: YANG       Appointment Date: 04/29/2025  Check-In Time: 02:45 PM    Phone Follow-Up Date/Time: 05/13/2025 @ 03:30 PM    Prior to the procedure:  Please update us prior to the procedure if you are experiencing any symptoms of infection such as cough, fever, chills, urinary symptoms, or have recently been prescribed antibiotics, have open wounds, have recently had surgery or dental procedures.    Day of Procedure:  **Drivers will be required for patients who receive prescriptions for Valium.    NO FASTING REQUIRED  Please bring your Insurance Card, Photo ID, List of Current Medications and Referral (if applicable) to your appointment.  Please park in the [facility] and follow the signs to the \[facility]\"".  Check in at [facility] ([address] [place] [address]) outpatient registration in the \[facility]\"".  Please note-No prescriptions will be written by Pain Clinic in [place] on the day of procedure. If you require a refill of medications, please contact the office 48 hours prior to your procedure.  If you have an implanted Spinal Cord or Peripheral Nerve Stimulator: Please remember to turn device off for procedure.        Medication Hold:    Number of days you need to be off for the following medications:    Aggrenox 10 days   Agrylin (Anagrelide) 10 days  Brilinta (Ticagrelor) 7 days  Imbruvica (Ibrutinib) 3 days   Enbrel (Etanercept) 24 hours   Fragmin (Dalteparin) 24 hours   Pletal (Cilostazol) 7  days  Effient (Prasugrel) 7 days  Pradaxa 10 days  Trental 7 days  Eliquis (Apixaban) 3 days  Xarelto (Rivaroxaban) 3 days  Lovenox (Enoxaparin) 24 hours  Aspirin  Greater than 81mg but less than 325mg   5 days  325mg and greater                  7 days  Coumadin       5 days  Procedure may be cancelled if INR is elevated.   Excedrin (with aspirin) 7 days  Plavix (Clopidogrel)                            7 days    NSAIDs: 24 hours preferred      Ibuprofen (Motrin, Advil, Vicoprofen), Naproxen (Naprosyn, Aleve), Piroxcam (Feldene), Meloxicam (Mobic), Oxaprozin (Daypro), Diclofenac (Voltaren), Indomethacin (Indocin), Etodolac (Lodine), Nabumetone (Relafen), Celebrex (Celecoxib)           HERBAL SUPPLEMENTS  5 days preferred  Fish oil, krill oil, Omega-3, Vascepa, Vitamin E, Turmeric, Garlic                       Insurance Authorization:   Most insurances are now requiring a preauthorization for all procedures.  In the event that your insurance does not authorize your procedure within 48 hours of the scheduled date, your procedure will be cancelled and rescheduled to a later date.  Please contact your insurance carrier to determine what your financial responsibility will be for the procedure(s).      Cancellation/Rescheduling Appointment:   In the event you need to cancel or reschedule your appointment, you must notify the office 24 hours prior.    Post-procedure instructions:        Please schedule a follow up visit within 2 to 4 weeks after your last procedure date   Please call our office with any questions or concerns before or after your procedure at  [phone].  If you are a diabetic, please increase the frequency of your glucose monitoring after the procedure as this may cause a temporary increase in your blood sugar.  Contact your primary care physician if your blood sugar rises as you may require some medication adjustment.  It is normal to have increased pain at injection site for up to 3-5 days after  procedure, you can use heat or ice (20 minutes on 20 minutes off) for comfort.    **To hear a recorded version of these instructions, please call [phone] and follow the prompts.  **Para escuchar las instrucciones en Español, por favor de llamar el adriane [phone] opción 4.

## 2025-04-14 RX ORDER — GABAPENTIN 300 MG/1
300 CAPSULE ORAL 3 TIMES DAILY
Qty: 90 CAPSULE | Refills: 0 | Status: SHIPPED | OUTPATIENT
Start: 2025-04-14

## 2025-04-14 NOTE — TELEPHONE ENCOUNTER
Medication:   gabapentin 300 MG Oral Cap     Date of last refill: 3/3/25    Last office visit: 3/3/25  Due back to clinic per last office note:  NA  Date next office visit scheduled:  5/13/25    Last OV note recommendation: Plan: Refill medications without change.  Will drop off images, and after review, can consider LESI.             
None

## 2025-04-18 ENCOUNTER — TELEPHONE (OUTPATIENT)
Dept: PAIN CLINIC | Facility: CLINIC | Age: 60
End: 2025-04-18

## 2025-04-18 NOTE — TELEPHONE ENCOUNTER
Mir Bhat MD to Me (Selected Message)      4/18/25  2:56 PM  Increase to gabapentin 600 at night    Contacted pt to relay recommendations above. Pt VU. Encouraged pt to call back on Monday if he does not feel this is providing any amount of relief. Pt VU.

## 2025-04-18 NOTE — TELEPHONE ENCOUNTER
Patient states he is in terrible pain. He can barely stand.  Patient states pain is in the lower back and down the right leg.  Patient states GABAPENTIN 300 MG Oral Cap is not working and is asking if Miguel can prescribe any other pain medication.

## 2025-04-18 NOTE — TELEPHONE ENCOUNTER
Pt is prescribed celebrex 200 mg daily by Dr. Villegas.     Pt is prescribed gabapentin 300 mg TID by Miguel MORGAN.    Pt is scheduled for LESI on 4/29/25    Virtual Visit on 3/3/25:    Medical Decision Making:   Diagnosis:         Encounter Diagnoses   Name Primary?    Spondylolisthesis of lumbar region Yes    Lumbar radiculitis           Impression: no relief with PT, though has found neurontin to be effective.  MRI is relatively benign, with mild spondylolisthesis without significant stenosis per report, though do not have images for review personally.  Asked that he drop off images, and after review, would be willing to consider possible LESI.       Plan: Refill medications without change.  Will drop off images, and after review, can consider LESI.

## 2025-04-21 RX ORDER — PREGABALIN 75 MG/1
75 CAPSULE ORAL 3 TIMES DAILY
Qty: 90 CAPSULE | Refills: 0 | Status: SHIPPED | OUTPATIENT
Start: 2025-04-21

## 2025-04-21 NOTE — TELEPHONE ENCOUNTER
Miguel Smith PA to [facility] Pain Nurse (Selected Message)        4/21/25  9:45 AM  We can consider changing to lyrica and stopping the neurontin if he would like.  If so, can change him to 75 mg.  Start at BID dosing, and see how that goes, and could consider upping to TID if it is not working after 5 days.

## 2025-04-21 NOTE — TELEPHONE ENCOUNTER
Pt was instructed to increase his nightly dose of gabapentin to 600 mg on 4/18/25. (See Condition Update TE dated 4/18/25)    Pt is scheduled for LESI on 4/29/25.

## 2025-04-29 ENCOUNTER — HOSPITAL ENCOUNTER (OUTPATIENT)
Facility: HOSPITAL | Age: 60
Setting detail: HOSPITAL OUTPATIENT SURGERY
Discharge: HOME | End: 2025-04-29
Attending: ANESTHESIOLOGY | Admitting: ANESTHESIOLOGY
Payer: COMMERCIAL

## 2025-04-29 ENCOUNTER — APPOINTMENT (OUTPATIENT)
Dept: GENERAL RADIOLOGY | Facility: HOSPITAL | Age: 60
End: 2025-04-29
Attending: ANESTHESIOLOGY
Payer: COMMERCIAL

## 2025-04-29 VITALS
HEART RATE: 71 BPM | TEMPERATURE: 98 F | HEIGHT: 72 IN | DIASTOLIC BLOOD PRESSURE: 92 MMHG | BODY MASS INDEX: 24.38 KG/M2 | OXYGEN SATURATION: 98 % | RESPIRATION RATE: 16 BRPM | WEIGHT: 180 LBS | SYSTOLIC BLOOD PRESSURE: 141 MMHG

## 2025-04-29 PROBLEM — M54.16 LUMBAR RADICULITIS: Status: ACTIVE | Noted: 2025-04-29

## 2025-04-29 PROCEDURE — 62323 NJX INTERLAMINAR LMBR/SAC: CPT | Performed by: ANESTHESIOLOGY

## 2025-04-29 RX ORDER — SODIUM CHLORIDE 9 MG/ML
INJECTION, SOLUTION INTRAMUSCULAR; INTRAVENOUS; SUBCUTANEOUS
Status: DISCONTINUED | OUTPATIENT
Start: 2025-04-29 | End: 2025-04-29

## 2025-04-29 RX ORDER — LIDOCAINE HYDROCHLORIDE 10 MG/ML
INJECTION, SOLUTION EPIDURAL; INFILTRATION; INTRACAUDAL; PERINEURAL
Status: DISCONTINUED | OUTPATIENT
Start: 2025-04-29 | End: 2025-04-29

## 2025-04-29 RX ORDER — DEXAMETHASONE SODIUM PHOSPHATE 10 MG/ML
INJECTION, SOLUTION INTRAMUSCULAR; INTRAVENOUS
Status: DISCONTINUED | OUTPATIENT
Start: 2025-04-29 | End: 2025-04-29

## 2025-04-29 RX ORDER — ASPIRIN 81 MG/1
81 TABLET, CHEWABLE ORAL DAILY
COMMUNITY

## 2025-04-29 RX ORDER — NALOXONE HYDROCHLORIDE 0.4 MG/ML
0.08 INJECTION, SOLUTION INTRAMUSCULAR; INTRAVENOUS; SUBCUTANEOUS AS NEEDED
Status: DISCONTINUED | OUTPATIENT
Start: 2025-04-29 | End: 2025-04-29

## 2025-04-29 NOTE — DISCHARGE INSTRUCTIONS
Home Care Instructions Following Your Pain Procedure     Mateo,  It has been a pleasure to have you as our patient. To help you at home, you must follow these general discharge instructions. We will review these with you before you are discharged. It is our hope that you have a complete and uneventful recovery from our procedure.     General Instructions:  What to Expect:  Bandages from your procedure today can be removed when you get home.  Please avoid soaking and/or swimming for 24 hours.  Showering is okay  It is normal to have increased pain symptoms and/or pain at injection site for up to 3-5 days after procedure, you can use heat or ice (20 minutes on 20 minutes off) for comfort.  You may experience some temporary side effects which may include restlessness or insomnia, flushing of the face, or heart palpitations.  Please contact the provider if these symptoms do not resolve within 3-4 days.  Lightheadedness or nausea may occur and should resolve within 24 to 48 hours.  If you develop a headache after treatment, rest, drink fluids (with caffeine, if possible) and take mild over-the-counter pain medication.  If the headache does not improve with the above treatment, contact the physician.  Home Medications:  Resume all previously prescribed medication.  Please avoid taking NSAIDs (Non-Steriodal Anti-Inflammatory Drugs) such as:  Ibuprofen ( Advil, Motrin) Aleve (Naproxen), Diclofenac, Meloxicam for 6 hours after procedure.   If you are on Coumadin (Warfarin) or any other anti-coagulant (or \"blood thinning\") medication such as Plavix (Clopidogrel), Xarelto (Rivaroxaban), Eliquis (Apixaban), Effient (Prasugrel) etc., restart on the following day from the procedure unless otherwise directed by your provider.  If you are a diabetic, please increase the frequency of your glucose monitoring after the procedure as steroids may cause a temporary (2-3 day) increase in your blood sugar.  Contact your primary care  physician if your blood sugar remains elevated as you may require some medication adjustment.  Diet:  Resume your regular diet as tolerated.  Activity:  We recommend that you relax and rest during the rest of your procedure day.  If you feel weakness in your arms or legs do not drive.  Follow-up Appointment  Please schedule a follow-up visit within 3 to 4 weeks after your last procedure date.  Question or Concerns:  Feel free to call our office with any questions or concerns at [phone] (option #2)    Mateo  Thank you for coming to [facility] for your procedure.  The nurses try very hard to make sure you receive the best care possible.  Your trust in them as well as us is greatly appreciated.    Thanks so much,   Dr. Mir Bhat

## 2025-04-29 NOTE — H&P
History & Physical Examination    Patient Name: Mateo Ramirez  MRN: [id number]  CSN: [id number]  YOB: 1965    Pre-Operative Diagnosis:  Lumbar radiculitis [M54.16]    Present Illness: Lumbar radiculitis    ASA: 2  MP class: 1  Sedation: Local       Prescriptions Prior to Admission[1]  Current Hospital Medications[2]    Allergies: Allergies[3]    Past Medical History[4]  Past Surgical History[5]  Family History[6]  Social History     Tobacco Use    Smoking status: Never    Smokeless tobacco: Never   Substance Use Topics    Alcohol use: Yes     Alcohol/week: 10.0 standard drinks of alcohol     Types: 4 Glasses of wine, 6 Cans of beer per week     Comment: On occasion, I have a glass of wine or a couple beers       SYSTEM Check if Review is Normal Check if Physical Exam is Normal If not normal, please explain:   HEENT [x ] [x ]    NECK & BACK [x ] [x ]    HEART [x ] [x ]    LUNGS [x ] [x ]    ABDOMEN [x ] [x ]    UROGENITAL [x ] [x ]    EXTREMITIES [x ] [x ]    OTHER        [ x ] I have discussed the risks and benefits and alternatives with the patient/family.  They understand and agree to proceed with plan of care.  [ x ] I have reviewed the History and Physical done within the last 30 days.  Any changes noted above.    Mir Bhat MD              [1]   Medications Prior to Admission   Medication Sig Dispense Refill Last Dose/Taking    pregabalin 75 MG Oral Cap Take 1 capsule (75 mg total) by mouth 3 (three) times daily. 90 capsule 0     CELECOXIB 200 MG Oral Cap TAKE ONE CAPSULE BY MOUTH ONE TIME DAILY 90 capsule 0     [] diazePAM 5 MG Oral Tab Take 1 tablet (5 mg total) by mouth 3 (three) times daily as needed (Pain). (Patient not taking: Reported on 2025) 8 tablet 0     Esomeprazole Magnesium 40 MG Oral Capsule Delayed Release Take 1 capsule by mouth every morning before breakfast. 90 capsule 1     losartan-hydroCHLOROthiazide 50-12.5 MG Oral Tab Take 1 tablet by mouth daily. 90  tablet 3     tamsulosin 0.4 MG Oral Cap Take 1 capsule (0.4 mg total) by mouth every evening. 90 capsule 6    [2]   No current facility-administered medications for this encounter.   [3] No Known Allergies  [4]   Past Medical History:   Esophageal reflux    Frequent urination    Heart burn    Heartburn   [5]   Past Surgical History:  Procedure Laterality Date    Colonoscopy  02/25/2020    VKA     Knee arthroscopy      Knee replacement surgery Left     2017   [6] History reviewed. No pertinent family history.

## 2025-04-29 NOTE — OPERATIVE REPORT
[facility]  Operative Report  2025     Mateo Ramirez Patient Status:  Hospital Outpatient Surgery    1965 MRN [id number]   Location [facility] Attending Mir Bhat MD   Hosp Day # 0 PCP Nima Villegas MD     Indication: Mateo is a 59 year old male with lumbar radiculitis    Preoperative Diagnosis:  Lumbar radiculitis [M54.16]    Postoperative Diagnosis: Same as above.    Procedure performed: LUMBAR INTERLAMINAR EPIDURAL INJECTION with local    Anesthesia: Local      EBL: Less than 1 ml.    Procedure Description:  After reviewing the patient's history and performing a focused physical examination, the diagnosis and positive previous diagnostic tests were confirmed and contraindications such as infection and coagulopathy were ruled out.  Following review of allergies and potential side effects and complications, including but not necessarily limited to infection, allergic reaction, local tissue breakdown, nerve injury, post-dural puncture headache and paresis, the patient consented for the procedure.    The patient was brought to the procedure room in prone position.  After sterile prep of the lumbar spine, the L4-L5 interspace was identified with the help of fluoroscopy. Local anesthetic was given by a 25 gauge 1.5 inch needle with 1% lidocaine in that space level.  Thereafter, a 20 gauge Tuohy needle was introduced and advanced under fluoroscopy.  The epidural space was accessed by using loss of resistance to air technique.  The needle position was confirmed with AP and lateral view under fluoroscopy.  Omnipaque 180 was injected in 1 mL volume. A good epidurogram was obtained.  Thereafter, dexamethasone 10 mg with normal saline of 5 mL in total volume of 6 mL was injected through the Tuohy needle.  The needle was flushed with 1 mL lidocaine.  The needle was withdrawn with the stylet intact in situ.  The needle's tip was intact.  The patient tolerated the procedure  very well without significant immediate complication.  The patient's back was cleaned and sterile dressing was applied.  The patient was discharged with an instruction to a responsible adult after discharge criteria were met.  The patient was advised to contact us should any problems happen.  The patient was also informed to go to the emergency room immediately if experiencing severe numbness or weakness in the extremities or experiencing bowel or bladder incontinence.            Complications: None.    Follow up: The patient was followed in the pain clinic as needed basis.          Mir Bhat MD

## 2025-04-30 ENCOUNTER — TELEPHONE (OUTPATIENT)
Dept: PAIN CLINIC | Facility: CLINIC | Age: 60
End: 2025-04-30

## 2025-04-30 NOTE — TELEPHONE ENCOUNTER
Patient said he has had the hiccups all day and is wondering if this is related to the procedure.

## 2025-04-30 NOTE — TELEPHONE ENCOUNTER
Hiccups are one of the side effects of the steroid injection and will go away on their own, the patient was informed when they were contacted. encouraged to stay hydrated. Pt vu, and there are now no more questions.

## 2025-04-30 NOTE — TELEPHONE ENCOUNTER
Elvis called placed to patient for post procedure follow up. Patient stated feels very good. Pt verbalized understanding to call with any questions or concerns.      Procedure: LESI  Date: 4/29/25  Follow up Visit Scheduled: 5/13/2025 3:30 PM with Miguel

## 2025-05-13 ENCOUNTER — VIRTUAL PHONE E/M (OUTPATIENT)
Dept: PAIN CLINIC | Facility: CLINIC | Age: 60
End: 2025-05-13
Payer: COMMERCIAL

## 2025-05-13 DIAGNOSIS — M54.16 RIGHT LUMBAR RADICULITIS: ICD-10-CM

## 2025-05-13 DIAGNOSIS — M43.16 SPONDYLOLISTHESIS OF LUMBAR REGION: Primary | ICD-10-CM

## 2025-05-13 PROCEDURE — 98013 SYNCH AUDIO-ONLY EST LOW 20: CPT | Performed by: PHYSICIAN ASSISTANT

## 2025-05-13 RX ORDER — GABAPENTIN 300 MG/1
300 CAPSULE ORAL 3 TIMES DAILY
Qty: 90 CAPSULE | Refills: 0 | OUTPATIENT
Start: 2025-05-13

## 2025-05-13 NOTE — PROGRESS NOTES
Mateo Ramirez verbally consents to a tele Visit Check-In service on 5/13/25.    Duration of Service:  20 minutes    HPI:   Mateo Ramirez presents with complaints of Low back pain radiating to R LE to dorsum of foot.    The pain is described as moderate aching, shooting that is intermittent.  The patient’s activity level has remained the same since last visit.  The pain is worst in the early morning.    Changes in condition/history since last visit: pt is here today via tele visit for f/u, having undergone LESI on 4/29/2025.  Procedure was well-tolerated and had no adverse effects.  Overall, reports only mild relief, estimating 20-30% improvement in pain.      Last procedure: LESI    date: 4/29/2025    Percentage of relief experienced from the procedure:20-30%    Duration of the relief: sustained     The following activities will increase the patient’s pain: walking, standing    The following activities decrease the patient’s pain: limiting activity level    Functional Assessment: Patient reports that they are able to complete all of their ADL's such as eating, bathing, using the toilet, dressing and getting up from a bed or a chair independently.    Current Medications:  Current Outpatient Medications   Medication Sig Dispense Refill    methylPREDNISolone (MEDROL) 4 MG Oral Tablet Therapy Pack Take as directed 1 each 0    aspirin 81 MG Oral Chew Tab Chew 1 tablet (81 mg total) by mouth daily.      pregabalin 75 MG Oral Cap Take 1 capsule (75 mg total) by mouth 3 (three) times daily. 90 capsule 0    CELECOXIB 200 MG Oral Cap TAKE ONE CAPSULE BY MOUTH ONE TIME DAILY 90 capsule 0    Esomeprazole Magnesium 40 MG Oral Capsule Delayed Release Take 1 capsule by mouth every morning before breakfast. 90 capsule 1    losartan-hydroCHLOROthiazide 50-12.5 MG Oral Tab Take 1 tablet by mouth daily. 90 tablet 3    tamsulosin 0.4 MG Oral Cap Take 1 capsule (0.4 mg total) by mouth every evening. 90 capsule 6      Patient  requires assistance with: No assistance required    Reviewed Patient History Dated: 4/29/25 no changes noted    Physical Exam:   There were no vitals taken for this visit.  VAS Pain Score:  8/10  General Appearance: Well developed, well nourished, normal build, independent body habitus, no apparent physical disabilities, well groomed    Neurological Exam: WNL-Orientation to time, place and person, normal mood & effect, normal concentration & attention span  Inspection: tele visit  Radiology/Lab Test Reviewed: MRI L spine:      Lab Results   Component Value Date    WBC 9.1 02/22/2025    WBC 8.1 01/28/2025    WBC 7.2 09/09/2023   No results found for: \"HEMOGLOBIN\"  Lab Results   Component Value Date    .0 02/22/2025    .0 01/28/2025    .0 09/09/2023     Do you have any known blood/bleeding disorders?  No  Does patient currently take blood thinners?   None  Does patient currently take any antibiotics?   No  Patient educated and verbalized understanding.  Medical Decision Making:   Diagnosis:    Encounter Diagnoses   Name Primary?    Spondylolisthesis of lumbar region Yes    Right lumbar radiculitis          Impression: no relief with PT, though has found neurontin to be partially effective initially, though became ineffective.  MRI shows a mild spondylolisthesis without significant stenosis per report, though there does appear to be some right subarticular stenosis, in keeping with his complaints.  He did undergo interlaminar DAVID to mild improvement, reporting 20 to 30% relief.  We did discuss potentially repeating injection versus transforaminal approach, and would like to consider transforaminal approach.  Will proceed with right L4 and L5 TF-DAVID, risks and benefits reviewed in detail.  If he again has suboptimal improvement, would have him evaluated by surgery.    Plan: Patient to schedule the following injection: Right L4 and L5 TF-DAVID Levels: L4-5 and L5-S1 foramen, respectively, Procedure and  risks were discussed with pt. including headache, bleeding, infection and potential nerve damage.  Follow-up 2 to 3 weeks.    No orders of the defined types were placed in this encounter.      Meds & Refills for this Visit:  Requested Prescriptions      No prescriptions requested or ordered in this encounter       Imaging & Consults:  None    The patient indicates understanding of these issues and agrees to the plan.    EDDIE Bernal

## 2025-05-13 NOTE — TELEPHONE ENCOUNTER
Per [employer] message dated 4/8/25 patient has been transitioned to Lyrica and refill is nor appropriate

## 2025-05-20 ENCOUNTER — OFFICE VISIT (OUTPATIENT)
Dept: NEUROLOGY | Facility: CLINIC | Age: 60
End: 2025-05-20
Payer: COMMERCIAL

## 2025-05-20 VITALS
WEIGHT: 180 LBS | DIASTOLIC BLOOD PRESSURE: 82 MMHG | BODY MASS INDEX: 24 KG/M2 | HEART RATE: 71 BPM | SYSTOLIC BLOOD PRESSURE: 124 MMHG | RESPIRATION RATE: 16 BRPM

## 2025-05-20 DIAGNOSIS — M54.16 LUMBAR RADICULOPATHY: Primary | ICD-10-CM

## 2025-05-20 DIAGNOSIS — M51.361 DEGENERATION OF INTERVERTEBRAL DISC OF LUMBAR REGION WITH LOWER EXTREMITY PAIN: ICD-10-CM

## 2025-05-20 DIAGNOSIS — Q28.3 DEVELOPMENTAL VENOUS ANOMALY (HCC): ICD-10-CM

## 2025-05-20 DIAGNOSIS — Z86.73 HISTORY OF TIA (TRANSIENT ISCHEMIC ATTACK): ICD-10-CM

## 2025-05-20 PROCEDURE — 3074F SYST BP LT 130 MM HG: CPT | Performed by: OTHER

## 2025-05-20 PROCEDURE — 99215 OFFICE O/P EST HI 40 MIN: CPT | Performed by: OTHER

## 2025-05-20 PROCEDURE — 3079F DIAST BP 80-89 MM HG: CPT | Performed by: OTHER

## 2025-05-20 RX ORDER — PREGABALIN 100 MG/1
100 CAPSULE ORAL 3 TIMES DAILY
Qty: 90 CAPSULE | Refills: 5 | Status: SHIPPED | OUTPATIENT
Start: 2025-05-20

## 2025-05-20 NOTE — PATIENT INSTRUCTIONS
Refill policies:    Allow 2-3 business days for refills; controlled substances may take longer.  Contact your pharmacy at least 5 days prior to running out of medication and have them send an electronic request or submit request through the “request refill” option in your [facility] account.  Refills are not addressed on weekends; covering physicians do not authorize routine medications on weekends.  No narcotics or controlled substances are refilled after noon on Fridays or by on call physicians.  By law, narcotics must be electronically prescribed.  A 30 day supply with no refills is the maximum allowed.  If your prescription is due for a refill, you may be due for a follow up appointment.  To best provide you care, patients receiving routine medications need to be seen at least once a year.  Patients receiving narcotic/controlled substance medications need to be seen at least once every 3 months.  In the event that your preferred pharmacy does not have the requested medication in stock (e.g. Backordered), it is your responsibility to find another pharmacy that has the requested medication available.  We will gladly send a new prescription to that pharmacy at your request.    Scheduling Tests:    If your physician has ordered radiology tests such as MRI or CT scans, please contact Central Scheduling at [phone] right away to schedule the test.  Once scheduled, the [facility] Centralized Referral Team will work with your insurance carrier to obtain pre-certification or prior authorization.  Depending on your insurance carrier, approval may take 3-10 days.  It is highly recommended patients assure they have received an authorization before having a test performed.  If test is done without insurance authorization, patient may be responsible for the entire amount billed.      Precertification and Prior Authorizations:  If your physician has recommended that you have a procedure or additional testing performed the [facility]  Centralized Referral Team will contact your insurance carrier to obtain pre-certification or prior authorization.    You are strongly encouraged to contact your insurance carrier to verify that your procedure/test has been approved and is a COVERED benefit.  Although the [facility] Centralized Referral Team does its due diligence, the insurance carrier gives the disclaimer that \"Although the procedure is authorized, this does not guarantee payment.\"    Ultimately the patient is responsible for payment.   Thank you for your understanding in this matter.  Paperwork Completion:  If you require FMLA or disability paperwork for your recovery, please make sure to either drop it off or have it faxed to our office at [phone]. Be sure the form has your name and date of birth on it.  The form will be faxed to our Forms Department and they will complete it for you.  There is a 25$ fee for all forms that need to be filled out.  Please be aware there is a 10-14 day turnaround time.  You will need to sign a release of information (KAREN) form if your paperwork does not come with one.  You may call the Forms Department with any questions at [phone].  Their fax number is [phone].

## 2025-05-20 NOTE — PROGRESS NOTES
[facility] Neurology Outpatient Progress Note  Date of service: 5/20/2025    Assessment & Plan    ICD-10-CM    1. Lumbar radiculopathy  M54.16 Referral to Physical Therapy and Rehab      2. Degeneration of intervertebral disc of lumbar region with lower extremity pain  M51.361 Referral to Physical Therapy and Rehab      3. Developmental venous anomaly (HCC)  Q28.3    No action required, imaging follow up    4. History of TIA (transient ischemic attack)  Z86.73         Cont ASA 81 mg  Change to lyrica 100mg tid  PT  Pain management to follow  Reviewed MRI L spine , EEG, echo, carotid doppler with pt  See orders and medications filed with this encounter. The patient indicates understanding of these issues and agrees with the plan.  Discussed with patient regarding assessment, care plan   RTC 6 months  Pt should go ER for any new or worsening symptoms and contact office       Procedures    Referral to Physical Therapy and Rehab     A total of 40 minutes were spent on patient care,  more than 50% was spent counseling patient/family regarding studies' results, assessment, treatment options and care plan, 10 minutes were spent in (pre- and/or during visit) reviewing clinical data including imaging, labs and progress notes related to therapy or treatment.      Subjective:   History of Present Illness    Patient here for a follow-up visit for back and sciatica affecting right side. No new TIA like symptoms, work up completed.  He had MRI L spine done and is following with pain management.  Per initial visit note:  Mateo Ramirez is a 59 year old male with past medical history as listed below presents here for initial evaluation of left arm weakness that occurred for a couple of days in December. Was in [place] for a wedding and acute onset of left arm weakness occurred. Symptoms have completely resolved. He states it lasted >1 day, he did not go ER; PCP ordered MRI, here to establish and review care plan, has right sciatica now and  is following with pain management; No new focal weakness, numbness, gait imbalance, vision or speech difficulties. Admits h/o traumatic brain injury. He is also following with GI re; heart burn.     History/Other:   REVIEW OF SYSTEMS:  A 10-point system was reviewed. Pertinent positives and negatives are noted as above     Medications - Current[1]  Allergies:  Allergies[2]  Past Medical History[3]  Past Surgical History[4]  Social History:  Social History     Tobacco Use    Smoking status: Never    Smokeless tobacco: Never   Substance Use Topics    Alcohol use: Yes     Alcohol/week: 10.0 standard drinks of alcohol     Types: 4 Glasses of wine, 6 Cans of beer per week     Comment: On occasion, I have a glass of wine or a couple beers     Family History[5]   Patient denies any pertinent family history associated with the current problem    Objective:   Neurological Examination:  /82   Pulse 71   Resp 16   Wt 180 lb (81.6 kg)   BMI 24.41 kg/m²   Mental status: A & O X 3  Language: no aphasia  Speech: no dysarthria  CN II-XII: intact   Motor strength: 5/5 all extremities  Tone: normal  DTRs: 2+ symmetric  Coordination: normal  Sensory: symmetric  Gait: normal    Test reviewed on 5/20/2025      Randi Casillas MD  Neurology  [facility]  5/20/2025, 4:02 PM  CC: Nima Villegas MD       [1]   Current Outpatient Medications:     aspirin 81 MG Oral Chew Tab, Chew 1 tablet (81 mg total) by mouth daily., Disp: , Rfl:     pregabalin 75 MG Oral Cap, Take 1 capsule (75 mg total) by mouth 3 (three) times daily., Disp: 90 capsule, Rfl: 0    CELECOXIB 200 MG Oral Cap, TAKE ONE CAPSULE BY MOUTH ONE TIME DAILY, Disp: 90 capsule, Rfl: 0    Esomeprazole Magnesium 40 MG Oral Capsule Delayed Release, Take 1 capsule by mouth every morning before breakfast., Disp: 90 capsule, Rfl: 1    losartan-hydroCHLOROthiazide 50-12.5 MG Oral Tab, Take 1 tablet by mouth daily., Disp: 90 tablet, Rfl: 3    tamsulosin 0.4 MG Oral  Cap, Take 1 capsule (0.4 mg total) by mouth every evening., Disp: 90 capsule, Rfl: 6  [2] No Known Allergies  [3]   Past Medical History:   Esophageal reflux    Frequent urination    Heart burn    Heartburn   [4]   Past Surgical History:  Procedure Laterality Date    Colonoscopy  02/25/2020    VKA     Knee arthroscopy      Knee replacement surgery Left     2017   [5] History reviewed. No pertinent family history.

## 2025-05-21 ENCOUNTER — TELEPHONE (OUTPATIENT)
Dept: PAIN CLINIC | Facility: CLINIC | Age: 60
End: 2025-05-21

## 2025-05-21 DIAGNOSIS — M54.16 LUMBAR RADICULITIS: Primary | ICD-10-CM

## 2025-05-21 NOTE — TELEPHONE ENCOUNTER
Prior authorization request completed for: right L4/5,L5/S1 TLESI  Authorization # no auth needed   Pre-D: no  Exclusions/Restrictions: no  Covered Benefit: yes  Authorization dates: n/a  CPT codes approved: 90494,75368  Number of visits/dates of service approved: 1  Physician: sarahi  Location: [facility]  Call Ref#: katharine cardozo 5/21/25 10:50am   Representative Name: katharine  Insurance Carrier: [employer] [phone]    Patient can be scheduled. Routed to Navigator.

## 2025-05-21 NOTE — TELEPHONE ENCOUNTER
Patient advised of insurance approval to proceed with injections and is agreeable to scheduling. pre-procedure instructions reviewed. Patient prefers Local sedation. Reviewed sedation instructions including No Fasting & No  Required. Patient encouraged but not required to hold Celecoxib & ASA 81 mg for 24 hours prior to procedure. Patient verbalized understanding of instructions, no further needs at this time.       [facility]  PRE-PROCEDURE INSTRUCTIONS WITHOUT SEDATION    Procedure: Right L4/5,L5/S1 TLESI       Appointment Date: 06/05/2025  Check-In Time: 02:30 PM    Phone Visit Follow-Up Date/Time: 06/19/2025 @ 03:30 PM    Prior to the procedure:  Please update us prior to the procedure if you are experiencing any symptoms of infection such as cough, fever, chills, urinary symptoms, or have recently been prescribed antibiotics, have open wounds, have recently had surgery or dental procedures.    Day of Procedure:  **Drivers will be required for patients who receive prescriptions for Valium.    NO FASTING REQUIRED  Please bring your Insurance Card, Photo ID, List of Current Medications and Referral (if applicable) to your appointment.  Please [facility] the [facility] and follow the signs to the [facility].  Check in at [facility] ([address] [place] [address]) outpatient registration in the [facility].  Please note-No prescriptions will be written by Pain Clinic in [place] on the day of procedure. If you require a refill of medications, please contact the office 48 hours prior to your procedure.  If you have an implanted Spinal Cord or Peripheral Nerve Stimulator: Please remember to turn device off for procedure.        Medication Hold:    Number of days you need to be off for the following medications:    Aggrenox 10 days   Agrylin (Anagrelide) 10 days  Brilinta (Ticagrelor) 7 days  Imbruvica (Ibrutinib) 3 days   Enbrel (Etanercept) 24 hours   Fragmin (Dalteparin) 24  hours   Pletal (Cilostazol) 7 days  Effient (Prasugrel) 7 days  Pradaxa 10 days  Trental 7 days  Eliquis (Apixaban) 3 days  Xarelto (Rivaroxaban) 3 days  Lovenox (Enoxaparin) 24 hours  Aspirin  Greater than 81mg but less than 325mg   5 days  325mg and greater                  7 days  Coumadin       5 days  Procedure may be cancelled if INR is elevated.   Excedrin (with aspirin) 7 days  Plavix (Clopidogrel)                            7 days    NSAIDs: 24 hours preferred      Ibuprofen (Motrin, Advil, Vicoprofen), Naproxen (Naprosyn, Aleve), Piroxcam (Feldene), Meloxicam (Mobic), Oxaprozin (Daypro), Diclofenac (Voltaren), Indomethacin (Indocin), Etodolac (Lodine), Nabumetone (Relafen), Celebrex (Celecoxib)           HERBAL SUPPLEMENTS  5 days preferred  Fish oil, krill oil, Omega-3, Vascepa, Vitamin E, Turmeric, Garlic                       Insurance Authorization:   Most insurances are now requiring a preauthorization for all procedures.  In the event that your insurance does not authorize your procedure within 48 hours of the scheduled date, your procedure will be cancelled and rescheduled to a later date.  Please contact your insurance carrier to determine what your financial responsibility will be for the procedure(s).      Cancellation/Rescheduling Appointment:   In the event you need to cancel or reschedule your appointment, you must notify the office 24 hours prior.    Post-procedure instructions:        Please schedule a follow up visit within 2 to 4 weeks after your last procedure date   Please call our office with any questions or concerns before or after your procedure at  [phone].  If you are a diabetic, please increase the frequency of your glucose monitoring after the procedure as this may cause a temporary increase in your blood sugar.  Contact your primary care physician if your blood sugar rises as you may require some medication adjustment.  It is normal to have increased pain at injection site  for up to 3-5 days after procedure, you can use heat or ice (20 minutes on 20 minutes off) for comfort.    **To hear a recorded version of these instructions, please call [phone] and follow the prompts.  **Para escuchar las instrucciones en Español, por favor de llamar el adriane [phone] opción 4.

## 2025-05-23 ENCOUNTER — TELEPHONE (OUTPATIENT)
Dept: NEUROLOGY | Facility: CLINIC | Age: 60
End: 2025-05-23

## 2025-05-23 NOTE — TELEPHONE ENCOUNTER
[facility] physical therapy note/order placed in Dr Casillas's folder for review and signature.  PT Plan: 2-3 times/week for 6 weeks.

## 2025-05-23 NOTE — TELEPHONE ENCOUNTER
Received signed copy back. Faxed to AT at [phone] Received confirmation back. Endorsed to scanning.

## 2025-06-05 ENCOUNTER — APPOINTMENT (OUTPATIENT)
Dept: GENERAL RADIOLOGY | Facility: HOSPITAL | Age: 60
End: 2025-06-05
Attending: ANESTHESIOLOGY
Payer: COMMERCIAL

## 2025-06-05 ENCOUNTER — HOSPITAL ENCOUNTER (OUTPATIENT)
Facility: HOSPITAL | Age: 60
Setting detail: HOSPITAL OUTPATIENT SURGERY
Discharge: HOME | End: 2025-06-05
Attending: ANESTHESIOLOGY | Admitting: ANESTHESIOLOGY
Payer: COMMERCIAL

## 2025-06-05 VITALS
TEMPERATURE: 98 F | RESPIRATION RATE: 18 BRPM | SYSTOLIC BLOOD PRESSURE: 149 MMHG | HEIGHT: 72 IN | WEIGHT: 180 LBS | BODY MASS INDEX: 24.38 KG/M2 | DIASTOLIC BLOOD PRESSURE: 96 MMHG | HEART RATE: 80 BPM | OXYGEN SATURATION: 99 %

## 2025-06-05 PROCEDURE — 64484 NJX AA&/STRD TFRM EPI L/S EA: CPT | Performed by: ANESTHESIOLOGY

## 2025-06-05 PROCEDURE — 64483 NJX AA&/STRD TFRM EPI L/S 1: CPT | Performed by: ANESTHESIOLOGY

## 2025-06-05 RX ORDER — IOPAMIDOL 408 MG/ML
INJECTION, SOLUTION INTRATHECAL
Status: DISCONTINUED | OUTPATIENT
Start: 2025-06-05 | End: 2025-06-05

## 2025-06-05 RX ORDER — NALOXONE HYDROCHLORIDE 0.4 MG/ML
0.08 INJECTION, SOLUTION INTRAMUSCULAR; INTRAVENOUS; SUBCUTANEOUS AS NEEDED
Status: DISCONTINUED | OUTPATIENT
Start: 2025-06-05 | End: 2025-06-05

## 2025-06-05 RX ORDER — SODIUM CHLORIDE 9 MG/ML
INJECTION, SOLUTION INTRAMUSCULAR; INTRAVENOUS; SUBCUTANEOUS
Status: DISCONTINUED | OUTPATIENT
Start: 2025-06-05 | End: 2025-06-05

## 2025-06-05 RX ORDER — LIDOCAINE HYDROCHLORIDE 10 MG/ML
INJECTION, SOLUTION EPIDURAL; INFILTRATION; INTRACAUDAL; PERINEURAL
Status: DISCONTINUED | OUTPATIENT
Start: 2025-06-05 | End: 2025-06-05

## 2025-06-05 RX ORDER — DEXAMETHASONE SODIUM PHOSPHATE 10 MG/ML
INJECTION, SOLUTION INTRAMUSCULAR; INTRAVENOUS
Status: DISCONTINUED | OUTPATIENT
Start: 2025-06-05 | End: 2025-06-05

## 2025-06-05 NOTE — DISCHARGE INSTRUCTIONS
Home Care Instructions Following Your Pain Procedure     Mateo,  It has been a pleasure to have you as our patient. To help you at home, you must follow these general discharge instructions. We will review these with you before you are discharged. It is our hope that you have a complete and uneventful recovery from our procedure.     General Instructions:  What to Expect:  Bandages from your procedure today can be removed when you get home.  Please avoid soaking and/or swimming for 24 hours.  Showering is okay  It is normal to have increased pain symptoms and/or pain at injection site for up to 3-5 days after procedure, you can use heat or ice (20 minutes on 20 minutes off) for comfort.  You may experience some temporary side effects which may include restlessness or insomnia, flushing of the face, or heart palpitations.  Please contact the provider if these symptoms do not resolve within 3-4 days.  Lightheadedness or nausea may occur and should resolve within 24 to 48 hours.  If you develop a headache after treatment, rest, drink fluids (with caffeine, if possible) and take mild over-the-counter pain medication.  If the headache does not improve with the above treatment, contact the physician.  Home Medications:  Resume all previously prescribed medication.  Please avoid taking NSAIDs (Non-Steriodal Anti-Inflammatory Drugs) such as:  Ibuprofen ( Advil, Motrin) Aleve (Naproxen), Diclofenac, Meloxicam for 6 hours after procedure.   If you are on Coumadin (Warfarin) or any other anti-coagulant (or \"blood thinning\") medication such as Plavix (Clopidogrel), Xarelto (Rivaroxaban), Eliquis (Apixaban), Effient (Prasugrel) etc., restart on the following day from the procedure unless otherwise directed by your provider.  If you are a diabetic, please increase the frequency of your glucose monitoring after the procedure as steroids may cause a temporary (2-3 day) increase in your blood sugar.  Contact your primary care  physician if your blood sugar remains elevated as you may require some medication adjustment.  Diet:  Resume your regular diet as tolerated.  Activity:  We recommend that you relax and rest during the rest of your procedure day.  If you feel weakness in your arms or legs do not drive.  Follow-up Appointment  Please schedule a follow-up visit within 3 to 4 weeks after your last procedure date.  Question or Concerns:  Feel free to call our office with any questions or concerns at [phone] (option #2)    Mateo  Thank you for coming to [facility] for your procedure.  The nurses try very hard to make sure you receive the best care possible.  Your trust in them as well as us is greatly appreciated.    Thanks so much,   Dr. Mir Bhat

## 2025-06-05 NOTE — OPERATIVE REPORT
[facility]  Operative Report  2025     Mateo Ramirez Patient Status:  Hospital Outpatient Surgery    1965 MRN [id number]   Location [facility] Attending Mir Bhat MD   Hosp Day # 0 PCP Nima Villegas MD     Indication: Mateo is a 59 year old male with lumbar radiculitis    Preoperative Diagnosis:  Lumbar radiculitis [M54.16]    Postoperative Diagnosis: Same as above.    Procedure performed: Right L4/5 and L5/S1 TF-DAVID (aka, L4 and L5 TF-DAVID) TRANSFORAMINAL LUMBAR EPIDURAL STEROID INJECTION MULTIPLE LEVEL with local     Anesthesia: Local      EBL: Less than 1 ml.    Procedure Description:  After reviewing the patient's history and performing a focused physical examination, the diagnosis was confirmed and contraindications such as infection and coagulopathy were ruled out.  Following review of potential side effects and complications, including but not necessarily limited to infection, allergic reaction, local tissue breakdown, nerve injury, and paresis, the patient indicated they understood and agreed to proceed.  After obtaining the informed consent, the patient was brought to the procedure room and monitored.          In the prone position, following sterile prep and drape of the lumbar region,  the  L4 neural foramen was identified under fluoroscopy.  The skin and subcutaneous tissue was anesthetized via 25-gauge 1.5\" needle with approximately 2 cc of 1% lidocaine.  A 22-gauge 5\" Quincke spinal needle was introduced toward the inferior aspect of the junction between the transverse process and pedicle of the  L4 level atraumatically under fluoroscopic guidance. The needle was advanced into the anterior epidural space at this level. The needle position was confirmed under AP and lateral fluoroscopic view.  Following negative aspiration for CSF and blood, approximately 1 cc of Omnipaque 240 was injected.  An excellent contrast spread along the epidural space and the  nerve root was obtained.  At this point, 1cc of normal saline with 5 mg of dexamethasone was injected without complication.  The needle was withdrawn with stylet in situ after being flushed with 1 cc PF lidocaine.     The  L5 neural foramen was also identified under fluoroscopy.  The skin and subcutaneous tissue was anesthetized via 25-gauge 1.5\" needle with approximately 2 cc of 1% lidocaine.  A 22-gauge 5\" Quincke spinal needle was introduced toward the inferior aspect of the junction between the transverse process and pedicle of the L5 level atraumatically under fluoroscopic guidance. The needle was advanced into the anterior epidural space at this level. The needle position was confirmed under AP and lateral fluoroscopic view.  Following negative aspiration for CSF and blood, approximately 1 cc of Omnipaque 240 was injected.  An excellent contrast spread along the epidural space and the nerve root was obtained.  At this point, 1cc of normal saline with 5 mg of dexamethasone was injected without complication.  The needle was withdrawn with stylet in situ after being flushed with 1 cc PF lidocaine..  The patient tolerated procedure very well.  The patient was observed until discharge criteria met.  Discharge instructions were given and patient was released to a responsible adult.       Complications: None.    Follow up:  The patient was followed in the pain clinic as needed basis.        Mir Bhat MD

## 2025-06-05 NOTE — H&P
History & Physical Examination    Patient Name: Mateo Ramirez  MRN: [id number]  [facility]: [id number]  YOB: 1965    Pre-Operative Diagnosis:  Lumbar radiculitis [M54.16]    Present Illness: Lumbar radicular    ASA: 2  MP class: 1  Sedation: Local      Prescriptions Prior to Admission[1]  Current Hospital Medications[2]    Allergies: Allergies[3]    Past Medical History[4]  Past Surgical History[5]  Family History[6]  Social History     Tobacco Use    Smoking status: Never    Smokeless tobacco: Never   Substance Use Topics    Alcohol use: Yes     Alcohol/week: 10.0 standard drinks of alcohol     Types: 4 Glasses of wine, 6 Cans of beer per week     Comment: On occasion, I have a glass of wine or a couple beers       SYSTEM Check if Review is Normal Check if Physical Exam is Normal If not normal, please explain:   HEENT [x ] [x ]    NECK & BACK [x ] [x ]    HEART [x ] [x ]    LUNGS [x ] [x ]    ABDOMEN [x ] [x ]    UROGENITAL [x ] [x ]    EXTREMITIES [x ] [x ]    OTHER        [ x ] I have discussed the risks and benefits and alternatives with the patient/family.  They understand and agree to proceed with plan of care.  [ x ] I have reviewed the History and Physical done within the last 30 days.  Any changes noted above.    Mir Bhat MD              [1]   Medications Prior to Admission   Medication Sig Dispense Refill Last Dose/Taking    pregabalin 100 MG Oral Cap Take 1 capsule (100 mg total) by mouth in the morning, at noon, and at bedtime. 90 capsule 5 6/5/2025    aspirin 81 MG Oral Chew Tab Chew 1 tablet (81 mg total) by mouth daily.   6/4/2025    CELECOXIB 200 MG Oral Cap TAKE ONE CAPSULE BY MOUTH ONE TIME DAILY 90 capsule 0 6/4/2025    Esomeprazole Magnesium 40 MG Oral Capsule Delayed Release Take 1 capsule by mouth every morning before breakfast. 90 capsule 1 6/5/2025    losartan-hydroCHLOROthiazide 50-12.5 MG Oral Tab Take 1 tablet by mouth daily. 90 tablet 3 6/5/2025 at  6:00 AM    tamsulosin  0.4 MG Oral Cap Take 1 capsule (0.4 mg total) by mouth every evening. 90 capsule 6 6/4/2025 Bedtime   [2]   No current facility-administered medications for this encounter.   [3] No Known Allergies  [4]   Past Medical History:   Esophageal reflux    Frequent urination    Heart burn    Heartburn   [5]   Past Surgical History:  Procedure Laterality Date    Colonoscopy  02/25/2020    [facility]     Knee arthroscopy      Knee replacement surgery Left     2017   [6] History reviewed. No pertinent family history.

## 2025-06-06 ENCOUNTER — TELEPHONE (OUTPATIENT)
Dept: PAIN CLINIC | Facility: CLINIC | Age: 60
End: 2025-06-06

## 2025-06-06 ENCOUNTER — TELEPHONE (OUTPATIENT)
Dept: NEUROLOGY | Facility: CLINIC | Age: 60
End: 2025-06-06

## 2025-06-06 NOTE — TELEPHONE ENCOUNTER
Received a callback from PT; advised PT that he can definitely go back to PT.  does not have restrictions after the injection. In regard to that, pregabalin, advised pt to reach out to Dr. Casillas, as he is the one who prescribed the medication for him. Pt. Vu and no further questions at this time.

## 2025-06-06 NOTE — TELEPHONE ENCOUNTER
Elvis called placed to patient for post procedure follow up. Patient stated feeling very well, very happy. Informed patient that soreness is to be expected after the procedure. Educated patient that it takes 3-5 days for the steroid to be effective and to allow adequate time for medication to work. Encouraged patient to alternate ice and heat and to take medications as prescribed. Pt verbalized understanding to call with any questions or concerns.      Procedure: Right L4/5 and L5/S1 TF-DAVID (aka, L4 and L5 TF-DAVID) TRANSFORAMINAL LUMBAR EPIDURAL STEROID INJECTION MULTIPLE LEVEL with local   Date: 06/05/25  Follow up Visit Scheduled: 06/19/25

## 2025-06-06 NOTE — TELEPHONE ENCOUNTER
Patient returning RN call.   Pending Prescriptions:                       Disp   Refills    predniSONE (DELTASONE) 20 MG tablet       100 ta*0            Sig: Take 3 tablets (60 mg) by mouth daily          Last Written Prescription Date:  06/08/2021  Last Fill Quantity: 100,   # refills: 0  Last Office Visit: 06/10/2021  Future Office visit:    Next 5 appointments (look out 90 days)    Aug 03, 2021  8:15 AM  Return Visit with Sabina Boyd MD  [facility] ([facility] ) [address]  [facility] [address] [phone]   Aug 26, 2021 11:00 AM  Telephone Visit with  [employer]  [facility] ([address] [address] [phone]           Routing refill request to provider     Anabell Hawkins RN, BSN, COLLEEN  RN Care Coordinator  [facility]  [phone]

## 2025-06-06 NOTE — TELEPHONE ENCOUNTER
Patient asking when he can resume physical therapy and when he can stop taking his pain medications.  Please call patient back.

## 2025-06-09 NOTE — TELEPHONE ENCOUNTER
Continue medication as long as needed for your symptom, in the future if you feel your symptoms have improved, you may attempt to wean down medication when able to.

## 2025-06-19 ENCOUNTER — VIRTUAL PHONE E/M (OUTPATIENT)
Dept: PAIN CLINIC | Facility: CLINIC | Age: 60
End: 2025-06-19
Payer: COMMERCIAL

## 2025-06-19 ENCOUNTER — TELEPHONE (OUTPATIENT)
Dept: FAMILY MEDICINE CLINIC | Facility: CLINIC | Age: 60
End: 2025-06-19

## 2025-06-19 DIAGNOSIS — M54.16 LUMBAR RADICULITIS: Primary | ICD-10-CM

## 2025-06-19 DIAGNOSIS — M43.16 SPONDYLOLISTHESIS OF LUMBAR REGION: ICD-10-CM

## 2025-06-19 PROCEDURE — 98013 SYNCH AUDIO-ONLY EST LOW 20: CPT | Performed by: PHYSICIAN ASSISTANT

## 2025-06-19 NOTE — PROGRESS NOTES
Mateo Ramirez verbally consents to a tele Visit Check-In service on 6/19/25.    Duration of Service:  20 minutes    HPI:   Mateo Ramirez presents with complaints of Low back pain radiating to R LE to dorsum of foot.    The pain is described as moderate aching, shooting that is intermittent.  The patient’s activity level has remained the same since last visit.  The pain is worst in the early morning.    Changes in condition/history since last visit: pt is here today via tele visit for f/u, having undergone right L4-5 and L5-S1 TF-DAVID on 6/5/2025.  Procedure was well-tolerated and had no adverse effects.  Overall, reports only minimal relief, similar to initial intralaminar DAVID (20-30% sustained relief).  He is active with PT/HEP, again, without relief.  Wishes to discuss more aggressive options.    Last procedure: Right L4-5 and L5-S1 TF-DAVID 6/5/2025    Percent relief: 20-30%    Duration: Sustained      Previous procedure: LESI    date: 4/29/2025    Percentage of relief experienced from the procedure:20-30%    Duration of the relief: sustained       The following activities will increase the patient’s pain: walking, standing    The following activities decrease the patient’s pain: limiting activity level    Functional Assessment: Patient reports that they are able to complete all of their ADL's such as eating, bathing, using the toilet, dressing and getting up from a bed or a chair independently.    Current Medications:  Current Outpatient Medications   Medication Sig Dispense Refill    pregabalin 100 MG Oral Cap Take 1 capsule (100 mg total) by mouth in the morning, at noon, and at bedtime. 90 capsule 5    aspirin 81 MG Oral Chew Tab Chew 1 tablet (81 mg total) by mouth daily.      CELECOXIB 200 MG Oral Cap TAKE ONE CAPSULE BY MOUTH ONE TIME DAILY 90 capsule 0    Esomeprazole Magnesium 40 MG Oral Capsule Delayed Release Take 1 capsule by mouth every morning before breakfast. 90 capsule 1     losartan-hydroCHLOROthiazide 50-12.5 MG Oral Tab Take 1 tablet by mouth daily. 90 tablet 3    tamsulosin 0.4 MG Oral Cap Take 1 capsule (0.4 mg total) by mouth every evening. 90 capsule 6      Patient requires assistance with: No assistance required    Reviewed Patient History Dated: 4/29/25 no changes noted    Physical Exam:   There were no vitals taken for this visit.  VAS Pain Score:  8/10  General Appearance: Well developed, well nourished, normal build, independent body habitus, no apparent physical disabilities, well groomed    Neurological Exam: WNL-Orientation to time, place and person, normal mood & effect, normal concentration & attention span  Inspection: tele visit  Radiology/Lab Test Reviewed: MRI L spine:      Lab Results   Component Value Date    WBC 9.1 02/22/2025    WBC 8.1 01/28/2025    WBC 7.2 09/09/2023   No results found for: \"HEMOGLOBIN\"  Lab Results   Component Value Date    .0 02/22/2025    .0 01/28/2025    .0 09/09/2023     Do you have any known blood/bleeding disorders?  No  Does patient currently take blood thinners?   None  Does patient currently take any antibiotics?   No  Patient educated and verbalized understanding.  Medical Decision Making:   Diagnosis:    Encounter Diagnoses   Name Primary?    Lumbar radiculitis Yes    Spondylolisthesis of lumbar region        Impression: no relief with PT, though has found neurontin to be partially effective initially, though became ineffective.  MRI shows a mild spondylolisthesis without significant stenosis per report, though there does appear to be some right subarticular stenosis, in keeping with his complaints.  He did undergo interlaminar DAVID to mild improvement, reporting 20 to 30% relief.  We did discuss potentially repeating injection versus transforaminal approach, and did opt for transforaminal approach.  Did proceed with right L4 and L5 TF-DAVID on 6/5/2025, to similar results (20 to 30% improvement in pain.  At this  time, wishes to discuss options with surgery, and order was provided.    Plan: Patient to follow up PRN.      No orders of the defined types were placed in this encounter.      Meds & Refills for this Visit:  Requested Prescriptions      No prescriptions requested or ordered in this encounter       Imaging & Consults:  None    The patient indicates understanding of these issues and agrees to the plan.    EDDIE Bernal

## 2025-06-20 RX ORDER — CELECOXIB 200 MG/1
200 CAPSULE ORAL DAILY
Qty: 90 CAPSULE | Refills: 0 | Status: SHIPPED | OUTPATIENT
Start: 2025-06-20

## 2025-06-20 NOTE — TELEPHONE ENCOUNTER
Please review. Refill failed protocol due to  In person appointment or virtual visit in the past 6 mos or appointment in next 3 mos     6/20/25 90 day refill given on 06/20/25, appointment needed for further refills.    Sent [email or web address] message to patient.

## 2025-06-30 ENCOUNTER — OFFICE VISIT (OUTPATIENT)
Dept: FAMILY MEDICINE CLINIC | Facility: CLINIC | Age: 60
End: 2025-06-30
Payer: COMMERCIAL

## 2025-06-30 VITALS
HEART RATE: 80 BPM | WEIGHT: 188 LBS | OXYGEN SATURATION: 98 % | DIASTOLIC BLOOD PRESSURE: 80 MMHG | SYSTOLIC BLOOD PRESSURE: 116 MMHG | HEIGHT: 72 IN | RESPIRATION RATE: 20 BRPM | BODY MASS INDEX: 25.47 KG/M2

## 2025-06-30 DIAGNOSIS — M54.16 RIGHT LUMBAR RADICULITIS: Primary | ICD-10-CM

## 2025-06-30 DIAGNOSIS — M62.81 MUSCLE WEAKNESS OF LEFT UPPER EXTREMITY: ICD-10-CM

## 2025-06-30 DIAGNOSIS — M54.16 LUMBAR RADICULITIS: ICD-10-CM

## 2025-06-30 PROCEDURE — 3008F BODY MASS INDEX DOCD: CPT | Performed by: FAMILY MEDICINE

## 2025-06-30 PROCEDURE — 3074F SYST BP LT 130 MM HG: CPT | Performed by: FAMILY MEDICINE

## 2025-06-30 PROCEDURE — 3079F DIAST BP 80-89 MM HG: CPT | Performed by: FAMILY MEDICINE

## 2025-06-30 PROCEDURE — 99214 OFFICE O/P EST MOD 30 MIN: CPT | Performed by: FAMILY MEDICINE

## 2025-06-30 RX ORDER — CELECOXIB 200 MG/1
200 CAPSULE ORAL DAILY
Qty: 90 CAPSULE | Refills: 2 | Status: SHIPPED | OUTPATIENT
Start: 2025-06-30

## 2025-06-30 NOTE — PROGRESS NOTES
The following individual(s) verbally consented to be recorded using ambient AI listening technology and understand that they can each withdraw their consent to this listening technology at any point by asking the clinician to turn off or pause the recording:    Patient name: Mateo REYES Ashley

## 2025-07-01 NOTE — PROGRESS NOTES
HPI:    Mateo Ramirez is a 60 year old male who presents for Follow - Up (BP) and Medication Follow-Up (Anti-inflammatory medication)     History of Present Illness  Mateo Ramirez is a 60 year old male who presents with persistent and worsening sciatic nerve pain.    He has been experiencing persistent sciatic nerve pain radiating from his lower back down his legs, described as sharp and debilitating. This pain has significantly impacted his ability to walk and perform daily activities, such as putting on boots. The pain has been ongoing for the past year, with only two days of relief during this period.    He has undergone two epidural steroid injections, the first in the spine and the second at the L4 and L5 levels, neither of which provided relief. The pain worsened after the second injection. He has been attending physical therapy for an extended period, but this has not alleviated his symptoms. He recalls an incident where he was unable to walk after a therapy session, necessitating an emergency room visit.    He is currently taking gabapentin, starting at 50 mg three times a day and increasing to 100 mg three times a day, but there is no significant pain relief from this medication. He also takes Celebrex, which is not effective for his back pain.    In December, he experienced a sudden loss of movement in his arm while on vacation, which led him to suspect a stroke or heart attack. He has since been on blood pressure medication, which he takes daily.    He expresses significant frustration with his current condition, noting the impact on his ability to work and perform daily activities. He mentions that he has been unable to enjoy activities such as fishing, which he could do before his last epidural injection. He also describes the support from his community, who have offered various aids like cushions and pillows, though these have not provided relief.       Past History:   He  has a past medical  history of Esophageal reflux, Frequent urination (1 2024), Heart burn, and Heartburn (1 1990).   He  has a past surgical history that includes knee replacement surgery (Left); knee arthroscopy; and colonoscopy (02/25/2020).   His family history is not on file.   He  reports that he has never smoked. He has never used smokeless tobacco. He reports current alcohol use of about 10.0 standard drinks of alcohol per week. He reports that he does not use drugs.     He is not on any long-term medications.   He has no known allergies.   Medications Ordered Prior to this Encounter[1]      REVIEW OF SYSTEMS:   Patient denies shortness of breath, denies chest pain and denies any recent fevers or chills.    Patient reports no urinary complaints and denies headaches or visual disturbances.   Patient denies any abdominal pain at this time. Patient has no new skin lesions.  Patient reports no acute back pain and reports no dizziness or headaches.   Patient reports no visual disturbances and reports hearing has been about the same.   Patient reports no recent injury or trauma.               EXAM:    /80   Pulse 80   Resp 20   Ht 6' (1.829 m)   Wt 188 lb (85.3 kg)   SpO2 98%   BMI 25.50 kg/m²  Estimated body mass index is 25.5 kg/m² as calculated from the following:    Height as of this encounter: 6' (1.829 m).    Weight as of this encounter: 188 lb (85.3 kg).    General Appearance:  Alert, cooperative, no distress, appears stated age   Head:  Normocephalic, without obvious abnormality, atraumatic   Eyes:  conjunctiva/cornea is not erythematous.        Nose: No nasal drainage.    Throat: No erythema    Neck: Supple, symmetrical, trachea midline, and normal ROM  thyroid: no obvious nodules   Back:   Symmetric, no curvature, ROM normal, no CVA tenderness   Lungs:   Clear to auscultation bilaterally, respirations unlabored   Chest Wall:  No tenderness or deformity   Heart:  Regular rate and rhythm, S1, S2 normal, no murmur,    Abdomen:   Soft, non-tender, bowel sounds active. No hernia.    Genitalia:     Rectal:     Extremities: Extremities normal, atraumatic, no cyanosis or edema   Pulses: 2+ and symmetric   Skin: Skin color, texture, turgor normal, no new rashes    Lymph nodes: No obvious cervical adenopathy.    Neurologic and psych: Normal speech, Alert and oriented x 3.   Normal mood, normal insight and judgment.    Right lumbar radicular s/s.         Assessment & Plan  Sciatica  Chronic and severe sciatic nerve pain radiating from the lower back down the leg, persisting for over a year. Previous interventions include two epidural steroid injections at L4-L5, which have not provided relief and may have worsened symptoms. He experiences debilitating pain affecting daily activities and work. MRI shows a significant bulging disc that is pushing on a nerve. He is not a candidate for surgery and prefers to avoid it. Current medications include gabapentin and Celebrex, with limited relief. He is considering radiofrequency ablation as a potential treatment option.  - Refer to Dr. Bhat for consideration of radiofrequency ablation.  - Communicate with Dr. Elliott regarding the expectation of radiofrequency ablation and lack of relief from epidural injections.    - Continue gabapentin and Celebrex.    Shoulder Pain  Reports shoulder pain, which is somewhat alleviated by Celebrex. The pain is exacerbated when Celebrex is not taken, indicating its effectiveness in managing inflammation-related symptoms.  - Continue Celebrex for anti-inflammatory effect.    Hypertension  Previously uncontrolled hypertension, now well-managed with medication. He reports not regularly checking blood pressure but is compliant with medication. Recent blood pressure readings are stable.  - Continue current antihypertensive medication.           ASSESSMENT AND PLAN:   Diagnoses and all orders for this visit:    Right lumbar radiculitis  -     celecoxib 200 MG Oral Cap;  Take 1 capsule (200 mg total) by mouth daily.  -     Cancel: Pain Management Referral - In Network    Muscle weakness of left upper extremity  -     Cancel: Pain Management Referral - In Network    Lumbar radiculitis  -     Cancel: Pain Management Referral - In Network           Nima Villegas MD, 6/30/2025, 7:20 PM     Note to patient: The 21st Century Cures Act makes medical notes like these available to patients in the interest of transparency. However, this is a medical document intended as peer to peer communication. It is written in medical language and may contain abbreviations or verbiage that are unfamiliar. It may appear blunt or direct. Medical documents are intended to carry relevant information, facts as evident, and the clinical opinion of the practitioner who signs the document.        [1]   Current Outpatient Medications on File Prior to Visit   Medication Sig    pregabalin 100 MG Oral Cap Take 1 capsule (100 mg total) by mouth in the morning, at noon, and at bedtime.    aspirin 81 MG Oral Chew Tab Chew 1 tablet (81 mg total) by mouth daily.    Esomeprazole Magnesium 40 MG Oral Capsule Delayed Release Take 1 capsule by mouth every morning before breakfast.    losartan-hydroCHLOROthiazide 50-12.5 MG Oral Tab Take 1 tablet by mouth daily.    tamsulosin 0.4 MG Oral Cap Take 1 capsule (0.4 mg total) by mouth every evening.     No current facility-administered medications on file prior to visit.

## 2025-07-03 ENCOUNTER — TELEPHONE (OUTPATIENT)
Dept: FAMILY MEDICINE CLINIC | Facility: CLINIC | Age: 60
End: 2025-07-03

## 2025-07-03 NOTE — TELEPHONE ENCOUNTER
Patient called to report increased fatigue/tiredness and sleeps in on the weekends. States he saw Dr. Villegas on 6/30/25 and forgot to mention how tired he has been while on Gabapentin and notes that it doesn't really help, he is always in pain    Dr. Villegas - LOV 6/30/25 - patient forgot to mention that the Gabapentin is making him very tired and sleepy

## 2025-07-07 RX ORDER — ESOMEPRAZOLE MAGNESIUM 40 MG/1
40 CAPSULE, DELAYED RELEASE ORAL
Qty: 90 CAPSULE | Refills: 3 | Status: SHIPPED | OUTPATIENT
Start: 2025-07-07

## 2025-07-07 NOTE — TELEPHONE ENCOUNTER
Let patient know that I d/w dr. Bhat for patient to have radiofrequency ablation procedure and he should follow-up with Dr. Bhat to discuss. Ok to continue gabapentin prn and should take more in evening or q hs prn.

## 2025-07-07 NOTE — TELEPHONE ENCOUNTER
Refill passes per [facility] protocol.    No future appointments with primary care medicine

## 2025-07-08 ENCOUNTER — TELEPHONE (OUTPATIENT)
Dept: PAIN CLINIC | Facility: CLINIC | Age: 60
End: 2025-07-08

## 2025-07-08 NOTE — TELEPHONE ENCOUNTER
Patient called stating that he had a phone visit with provider on 6/19 and the discussion of having an ablation came up and now he would like to go through with that.    Patient stated that he had Right L4/5,L5/S1 TLESI done on 6/5, which patient stated did not help at all.    Patient asked for a call back.

## 2025-07-08 NOTE — TELEPHONE ENCOUNTER
Miguel Smith PA to [facility] Pain Nurse (Selected Message)        7/8/25 10:47 AM  RFA is not a procedure for radiating LE pain, as it simply targets the facet.  As such, it would have no legitimate chance of helping sciatic pain.  If his pain was only LBP, could consider MBNB x2, and if diagnostically effective, would then consider RFA, though as far as I am aware, he has pain radiating all the way to the foot.      Contacted pt at this time, relayed the message above. Pt MARIEL . RN provided the number to call for . No further questions at this time.

## 2025-07-10 ENCOUNTER — TELEPHONE (OUTPATIENT)
Dept: NEUROLOGY | Facility: CLINIC | Age: 60
End: 2025-07-10

## 2025-07-10 NOTE — TELEPHONE ENCOUNTER
Fax received from [facility] regarding patient  Discharge Summary - 7/9/25 plcaed in provider for review

## 2025-07-11 NOTE — TELEPHONE ENCOUNTER
Reviewed and signed by provider.    Faxed back to [facility]    Confirmation received    Sent for scanning on med rec encounter dated 7/11/25

## 2025-07-17 ENCOUNTER — TELEPHONE (OUTPATIENT)
Dept: NEUROLOGY | Facility: CLINIC | Age: 60
End: 2025-07-17

## 2025-07-17 ENCOUNTER — TELEPHONE (OUTPATIENT)
Dept: PAIN CLINIC | Facility: CLINIC | Age: 60
End: 2025-07-17

## 2025-07-17 DIAGNOSIS — M54.16 LUMBAR RADICULITIS: Primary | ICD-10-CM

## 2025-07-17 NOTE — TELEPHONE ENCOUNTER
Transferred from pain management. Patient was calling stating that his pregabalin is not doing anything for him. He is currently on 100 mg TID. He states he needs something else or an increase in the medication he currently has. He would also like to discuss Dr. Santiago opinion on surgery or nerve ablation.

## 2025-07-17 NOTE — TELEPHONE ENCOUNTER
Per  notes on 5/20/2025;Cont ASA 81 mg  Change to lyrica 100mg tid  PT  Pain management to follow  Reviewed MRI L spine , EEG, echo, carotid doppler with pt  See orders and medications filed with this encounter. The patient indicates understanding of these issues and agrees with the plan.  Discussed with patient regarding assessment, care plan   RTC 6 months    Contacted pt at this time, advised pt that  is the one who prescribed him the pregabalin. Call transferred to [facility] from Neurology department.

## 2025-07-17 NOTE — TELEPHONE ENCOUNTER
Patient called stating that the pain medication that he was prescribed is not working and he would like the dosage higher.    Patient stated that he was prescribed pregabalin 100 MG Oral Cap by Miguel at 50mg, which he was prescribed 100mg.    Patient stated that he is taking the 100mg 3x a day and it is not helping.    Patient stated that the medication makes him sleepy/drowsy and he does not want that medication again.     [id number] - [place], [place] - [address] [phone], [phone]

## 2025-07-21 RX ORDER — PREGABALIN 150 MG/1
150 CAPSULE ORAL 3 TIMES DAILY
Qty: 90 CAPSULE | Refills: 3 | Status: SHIPPED | OUTPATIENT
Start: 2025-07-21

## 2025-07-21 NOTE — TELEPHONE ENCOUNTER
I increased lyrica to 150 mg tid. Regarding surgery or nerve ablation, that is a decision between you and your pain management specialist or surgeon; however you are welcome to make appt to discuss with me.

## 2025-07-28 ENCOUNTER — OFFICE VISIT (OUTPATIENT)
Dept: PAIN CLINIC | Facility: CLINIC | Age: 60
End: 2025-07-28
Payer: COMMERCIAL

## 2025-07-28 VITALS
WEIGHT: 188 LBS | SYSTOLIC BLOOD PRESSURE: 116 MMHG | HEIGHT: 72 IN | RESPIRATION RATE: 16 BRPM | BODY MASS INDEX: 25.47 KG/M2 | DIASTOLIC BLOOD PRESSURE: 76 MMHG | HEART RATE: 100 BPM

## 2025-07-28 DIAGNOSIS — M54.16 LUMBAR RADICULITIS: Primary | ICD-10-CM

## 2025-07-28 PROCEDURE — 3008F BODY MASS INDEX DOCD: CPT | Performed by: PHYSICIAN ASSISTANT

## 2025-07-28 PROCEDURE — 3078F DIAST BP <80 MM HG: CPT | Performed by: PHYSICIAN ASSISTANT

## 2025-07-28 PROCEDURE — 99214 OFFICE O/P EST MOD 30 MIN: CPT | Performed by: PHYSICIAN ASSISTANT

## 2025-07-28 PROCEDURE — 3074F SYST BP LT 130 MM HG: CPT | Performed by: PHYSICIAN ASSISTANT

## 2025-07-28 NOTE — PROGRESS NOTES
HPI:   Mateo Ramirez presents with complaints of Low back pain radiating to R LE to dorsum of foot.    The pain is described as moderate aching, shooting that is intermittent.  The patient’s activity level has remained the same since last visit.  The pain is worst in the early morning.    Changes in condition/history since last visit: pt is here today for f/u to discuss RFA.  He has low back and R LE pain to foot, and had minimal, brief relief with LESI on 4/29/25 and right L4-5 and L5-S1 TF-DAVID on 6/5/2025 (20-30% relief with each).  He had been to PT/HEP, again, without relief.  He is on lyrica, and has been increased to 150 mg TID, which is simply causing drowsiness.  He was sent for surgical evaluation, though has not called the surgeon.      Last procedure: Right L4-5 and L5-S1 TF-DAVID 6/5/2025    Percent relief: 20-30%    Duration: Sustained      Previous procedure: LESI    date: 4/29/2025    Percentage of relief experienced from the procedure:20-30%    Duration of the relief: sustained       The following activities will increase the patient’s pain: walking, standing    The following activities decrease the patient’s pain: limiting activity level    Functional Assessment: Patient reports that they are able to complete all of their ADL's such as eating, bathing, using the toilet, dressing and getting up from a bed or a chair independently.    Current Medications:  Current Outpatient Medications   Medication Sig Dispense Refill    pregabalin 150 MG Oral Cap Take 1 capsule (150 mg total) by mouth in the morning, at noon, and at bedtime. 90 capsule 3    Esomeprazole Magnesium 40 MG Oral Capsule Delayed Release Take 1 capsule (40 mg total) by mouth before breakfast. 90 capsule 3    celecoxib 200 MG Oral Cap Take 1 capsule (200 mg total) by mouth daily. 90 capsule 2    aspirin 81 MG Oral Chew Tab Chew 1 tablet (81 mg total) by mouth daily.      losartan-hydroCHLOROthiazide 50-12.5 MG Oral Tab Take 1 tablet by  mouth daily. 90 tablet 3    tamsulosin 0.4 MG Oral Cap Take 1 capsule (0.4 mg total) by mouth every evening. 90 capsule 6      Patient requires assistance with: No assistance required    Reviewed Patient History Dated: 4/29/25 no changes noted    Physical Exam:   /76 (BP Location: Right arm, Patient Position: Sitting, Cuff Size: large)   Pulse 100   Resp 16   Ht 72\"   Wt 188 lb (85.3 kg)   BMI 25.50 kg/m²   VAS Pain Score:  8/10  General Appearance: Well developed, well nourished, normal build, independent body habitus, no apparent physical disabilities, well groomed    Neurological Exam: WNL-Orientation to time, place and person, normal mood & effect, normal concentration & attention span  Inspection: tele visit  Radiology/Lab Test Reviewed: MRI L spine:      Lab Results   Component Value Date    WBC 9.1 02/22/2025    WBC 8.1 01/28/2025    WBC 7.2 09/09/2023   No results found for: \"HEMOGLOBIN\"  Lab Results   Component Value Date    .0 02/22/2025    .0 01/28/2025    .0 09/09/2023     Do you have any known blood/bleeding disorders?  No  Does patient currently take blood thinners?   None  Does patient currently take any antibiotics?   No  Patient educated and verbalized understanding.  Medical Decision Making:   Diagnosis:    Encounter Diagnosis   Name Primary?    Lumbar radiculitis Yes         Impression: no relief with PT, though had found neurontin to be partially effective initially, though became ineffective.  MRI shows a mild spondylolisthesis without significant stenosis per report, though there does appear to be some right subarticular stenosis, in keeping with his complaints.  He did undergo interlaminar DAVID to mild improvement, reporting 20 to 30% relief.  We did discuss potentially repeating injection versus transforaminal approach, and did opt for transforaminal approach.  Did proceed with right L4 and L5 TF-DAVID on 6/5/2025, to similar results (20 to 30% improvement in  pain).  Symptoms rapidly returned to baseline soon thereafter, and had sent him for surgical evaluation.  He has yet to contact  surgery, and instead, has been increasing lyrica, now up to 150 mg TID.  Reports no improvement in pain, and is getting drowsiness.  He wanted to discuss RFA, which we did, and explained that this is not a procedure that is likely to help radiating LE pain, but, rather, axial LBP.  As he has minimal LBP, and most of his pain is radicular, would avoid this at this time.  Asked that he contact surgery to discuss options.      Plan: Patient to follow up PRN.  Order in for surgical evaluation.      No orders of the defined types were placed in this encounter.      Meds & Refills for this Visit:  Requested Prescriptions      No prescriptions requested or ordered in this encounter       Imaging & Consults:  None    The patient indicates understanding of these issues and agrees to the plan.    EDDIE Bernal

## 2025-07-28 NOTE — PATIENT INSTRUCTIONS
Refill policies:    Allow 2-3 business days for refills; controlled substances may take longer.  Contact your pharmacy at least 5 days prior to running out of medication and have them send an electronic request or submit request through the “request refill” option in your [facility] account.  Refills are not addressed on weekends; covering physicians do not authorize routine medications on weekends.  No narcotics or controlled substances are refilled after noon on Fridays or by on call physicians.  By law, narcotics must be electronically prescribed.  A 30 day supply with no refills is the maximum allowed.  If your prescription is due for a refill, you may be due for a follow up appointment.  To best provide you care, patients receiving routine medications need to be seen at least once a year.  Patients receiving narcotic/controlled substance medications need to be seen at least once every 3 months.  In the event that your preferred pharmacy does not have the requested medication in stock (e.g. Backordered), it is your responsibility to find another pharmacy that has the requested medication available.  We will gladly send a new prescription to that pharmacy at your request.    Scheduling Tests:    If your physician has ordered radiology tests such as MRI or CT scans, please contact Central Scheduling at [phone] right away to schedule the test.  Once scheduled, the [facility] Centralized Referral Team will work with your insurance carrier to obtain pre-certification or prior authorization.  Depending on your insurance carrier, approval may take 3-10 days.  It is highly recommended patients assure they have received an authorization before having a test performed.  If test is done without insurance authorization, patient may be responsible for the entire amount billed.      Precertification and Prior Authorizations:  If your physician has recommended that you have a procedure or additional testing performed the [facility]  Centralized Referral Team will contact your insurance carrier to obtain pre-certification or prior authorization.    You are strongly encouraged to contact your insurance carrier to verify that your procedure/test has been approved and is a COVERED benefit.  Although the [facility] Centralized Referral Team does its due diligence, the insurance carrier gives the disclaimer that \"Although the procedure is authorized, this does not guarantee payment.\"    Ultimately the patient is responsible for payment.   Thank you for your understanding in this matter.  Paperwork Completion:  If you require FMLA or disability paperwork for your recovery, please make sure to either drop it off or have it faxed to our office at [phone]. Be sure the form has your name and date of birth on it.  The form will be faxed to our Forms Department and they will complete it for you.  There is a 25$ fee for all forms that need to be filled out.  Please be aware there is a 10-14 day turnaround time.  You will need to sign a release of information (KAREN) form if your paperwork does not come with one.  You may call the Forms Department with any questions at [phone].  Their fax number is [phone].

## 2025-08-06 DIAGNOSIS — M54.50 LOW BACK PAIN, UNSPECIFIED BACK PAIN LATERALITY, UNSPECIFIED CHRONICITY, UNSPECIFIED WHETHER SCIATICA PRESENT: Primary | ICD-10-CM

## 2025-08-08 ENCOUNTER — OFFICE VISIT (OUTPATIENT)
Facility: CLINIC | Age: 60
End: 2025-08-08

## 2025-08-08 ENCOUNTER — HOSPITAL ENCOUNTER (OUTPATIENT)
Dept: GENERAL RADIOLOGY | Age: 60
Discharge: HOME | End: 2025-08-08
Attending: STUDENT IN AN ORGANIZED HEALTH CARE EDUCATION/TRAINING PROGRAM

## 2025-08-08 VITALS — WEIGHT: 188 LBS | BODY MASS INDEX: 25.47 KG/M2 | HEIGHT: 72 IN

## 2025-08-08 DIAGNOSIS — M43.16 SPONDYLOLISTHESIS AT L4-L5 LEVEL: Primary | ICD-10-CM

## 2025-08-08 DIAGNOSIS — M54.50 LOW BACK PAIN, UNSPECIFIED BACK PAIN LATERALITY, UNSPECIFIED CHRONICITY, UNSPECIFIED WHETHER SCIATICA PRESENT: ICD-10-CM

## 2025-08-08 PROCEDURE — 72100 X-RAY EXAM L-S SPINE 2/3 VWS: CPT | Performed by: STUDENT IN AN ORGANIZED HEALTH CARE EDUCATION/TRAINING PROGRAM

## 2025-08-08 PROCEDURE — 3008F BODY MASS INDEX DOCD: CPT | Performed by: STUDENT IN AN ORGANIZED HEALTH CARE EDUCATION/TRAINING PROGRAM

## 2025-08-08 PROCEDURE — 99205 OFFICE O/P NEW HI 60 MIN: CPT | Performed by: STUDENT IN AN ORGANIZED HEALTH CARE EDUCATION/TRAINING PROGRAM

## 2025-08-08 PROCEDURE — G2211 COMPLEX E/M VISIT ADD ON: HCPCS | Performed by: STUDENT IN AN ORGANIZED HEALTH CARE EDUCATION/TRAINING PROGRAM

## 2025-08-11 RX ORDER — TAMSULOSIN HYDROCHLORIDE 0.4 MG/1
0.4 CAPSULE ORAL EVERY EVENING
Qty: 90 CAPSULE | Refills: 0 | OUTPATIENT
Start: 2025-08-11

## 2025-08-24 ENCOUNTER — HOSPITAL ENCOUNTER (EMERGENCY)
Age: 60
Discharge: HOME | End: 2025-08-24
Attending: EMERGENCY MEDICINE

## 2025-08-24 VITALS
WEIGHT: 190 LBS | BODY MASS INDEX: 26.6 KG/M2 | OXYGEN SATURATION: 100 % | TEMPERATURE: 98 F | HEART RATE: 105 BPM | RESPIRATION RATE: 20 BRPM | SYSTOLIC BLOOD PRESSURE: 156 MMHG | DIASTOLIC BLOOD PRESSURE: 99 MMHG | HEIGHT: 71 IN

## 2025-08-24 DIAGNOSIS — M54.16 LUMBAR RADICULOPATHY: ICD-10-CM

## 2025-08-24 DIAGNOSIS — M62.830 SPASM OF MUSCLE OF LOWER BACK: Primary | ICD-10-CM

## 2025-08-24 RX ORDER — CYCLOBENZAPRINE HCL 5 MG
5 TABLET ORAL 3 TIMES DAILY PRN
Qty: 15 TABLET | Refills: 0 | Status: SHIPPED | OUTPATIENT
Start: 2025-08-24

## 2025-08-24 RX ORDER — KETOROLAC TROMETHAMINE 30 MG/ML
30 INJECTION, SOLUTION INTRAMUSCULAR; INTRAVENOUS ONCE
Status: COMPLETED | OUTPATIENT
Start: 2025-08-24 | End: 2025-08-24

## 2025-08-24 RX ORDER — PREDNISONE 20 MG/1
40 TABLET ORAL DAILY
Qty: 10 TABLET | Refills: 0 | Status: SHIPPED | OUTPATIENT
Start: 2025-08-24 | End: 2025-08-29

## 2025-08-24 RX ORDER — PREDNISONE 20 MG/1
60 TABLET ORAL ONCE
Status: COMPLETED | OUTPATIENT
Start: 2025-08-24 | End: 2025-08-24

## 2025-08-24 RX ORDER — HYDROCODONE BITARTRATE AND ACETAMINOPHEN 5; 325 MG/1; MG/1
1-2 TABLET ORAL EVERY 6 HOURS PRN
Qty: 10 TABLET | Refills: 0 | Status: SHIPPED | OUTPATIENT
Start: 2025-08-24 | End: 2025-08-29

## 2025-08-27 RX ORDER — TAMSULOSIN HYDROCHLORIDE 0.4 MG/1
0.4 CAPSULE ORAL EVERY EVENING
Qty: 90 CAPSULE | Refills: 0 | OUTPATIENT
Start: 2025-08-27

## 2025-08-28 ENCOUNTER — TELEPHONE (OUTPATIENT)
Dept: FAMILY MEDICINE CLINIC | Facility: CLINIC | Age: 60
End: 2025-08-28

## (undated) DIAGNOSIS — M25.511 RIGHT SHOULDER PAIN, UNSPECIFIED CHRONICITY: Primary | ICD-10-CM

## (undated) DIAGNOSIS — M25.511 ACUTE PAIN OF RIGHT SHOULDER: ICD-10-CM

## (undated) DEVICE — ABSORBABLE HEMOSTAT (OXIDIZED REGENERATED CELLULOSE, U.S.P.): Brand: SURGICEL

## (undated) DEVICE — MINI LAP PACK-LF: Brand: MEDLINE INDUSTRIES, INC.

## (undated) DEVICE — SYRINGE 10ML SLIP TIP LOSS OF RESIST PLAS

## (undated) DEVICE — GLOVE,SURG,SENSICARE,ALOE,LF,PF,7: Brand: MEDLINE

## (undated) DEVICE — SKIN REG/FINE DUAL MARKER, RULER, LABELS: Brand: MEDLINE

## (undated) DEVICE — GAUZE SPONGES,USP TYPE VII GAUZE, 12 PLY: Brand: CURITY

## (undated) DEVICE — REM POLYHESIVE ADULT PATIENT RETURN ELECTRODE: Brand: VALLEYLAB

## (undated) DEVICE — PAIN TRAY: Brand: MEDLINE INDUSTRIES, INC.

## (undated) DEVICE — STERILE POLYISOPRENE POWDER-FREE SURGICAL GLOVES: Brand: PROTEXIS

## (undated) DEVICE — KENDALL SCD EXPRESS SLEEVES, KNEE LENGTH, MEDIUM: Brand: KENDALL SCD

## (undated) DEVICE — SUTURE VICRYL 3-0 SH

## (undated) DEVICE — BANDAGE ADH 1INX3IN NAT FAB N ADH PD CURAD

## (undated) DEVICE — SOL  .9 1000ML BTL

## (undated) DEVICE — SUTURE VICRYL 2-0 SH

## (undated) DEVICE — NEEDLE SPNL 22GA L5IN BLK HUB QNCKE BVL DISP

## (undated) DEVICE — SUTURE CHROMIC GUT 2-0 SH

## (undated) DEVICE — REMOVER LOT 4OZ N IRRIG UNSCNT SFT MOIST LIQ

## (undated) DEVICE — GLOVE SUR 7.5 SENSICARE PIP WHT PWD F

## (undated) DEVICE — NEEDLE SPINAL 22X3-1/2 BLK

## (undated) DEVICE — AVANOS* TUOHY EPIDURAL NEEDLE: Brand: AVANOS

## (undated) DEVICE — SPONGE STICK WITH PVP-I: Brand: KENDALL

## (undated) DEVICE — LAPAROTOMY SPONGE - RF AND X-RAY DETECTABLE PRE-WASHED: Brand: SITUATE

## (undated) NOTE — LETTER
03/01/21    Dear Neville Joshi MD,    I am seeing Annie Vieira in the office for ongoing evaluation and treatment.          Assessment   Hemorrhoid prolapse  (primary encounter diagnosis)      Plan   The patient had presented today for banding of enlarg

## (undated) NOTE — LETTER
20    Patient: Sloan Hickey  : 1965 Visit date: 2020    Dear  Dr. Juan Antonio Persaud MD,    Thank you for referring Stephaniesivakumar Edelmira to my practice. Please find my assessment and plan below.            Assessment   History of colon polyps  (kathy

## (undated) NOTE — LETTER
03/18/20    Dear Joel Gutierrez MD,    I am seeing Gabrielle Rubalcava in the office for post op evaluation and treatment.        Assessment   Serrated polyp of colon  (primary encounter diagnosis)  Grade III internal hemorrhoids      Plan   The patient is doing